# Patient Record
Sex: MALE | Race: WHITE | Employment: OTHER | URBAN - METROPOLITAN AREA
[De-identification: names, ages, dates, MRNs, and addresses within clinical notes are randomized per-mention and may not be internally consistent; named-entity substitution may affect disease eponyms.]

---

## 2020-05-11 ENCOUNTER — APPOINTMENT (OUTPATIENT)
Dept: CT IMAGING | Facility: CLINIC | Age: 65
DRG: 391 | End: 2020-05-11
Attending: EMERGENCY MEDICINE
Payer: OTHER GOVERNMENT

## 2020-05-11 ENCOUNTER — HOSPITAL ENCOUNTER (INPATIENT)
Facility: CLINIC | Age: 65
LOS: 7 days | Discharge: NURSING, REHAB OR LONG-TERM CARE | DRG: 391 | End: 2020-05-18
Attending: EMERGENCY MEDICINE | Admitting: HOSPITALIST
Payer: OTHER GOVERNMENT

## 2020-05-11 DIAGNOSIS — K57.32 DIVERTICULITIS OF COLON: ICD-10-CM

## 2020-05-11 DIAGNOSIS — R10.84 ABDOMINAL PAIN, GENERALIZED: Primary | ICD-10-CM

## 2020-05-11 DIAGNOSIS — E87.6 HYPOKALEMIA: ICD-10-CM

## 2020-05-11 DIAGNOSIS — E83.42 HYPOMAGNESEMIA: ICD-10-CM

## 2020-05-11 DIAGNOSIS — M62.81 GENERALIZED MUSCLE WEAKNESS: ICD-10-CM

## 2020-05-11 DIAGNOSIS — R19.7 DIARRHEA, UNSPECIFIED TYPE: ICD-10-CM

## 2020-05-11 DIAGNOSIS — K57.32 SIGMOID DIVERTICULITIS: ICD-10-CM

## 2020-05-11 DIAGNOSIS — F10.939 ALCOHOL WITHDRAWAL SYNDROME WITH COMPLICATION (H): ICD-10-CM

## 2020-05-11 DIAGNOSIS — G47.00 INSOMNIA, UNSPECIFIED TYPE: ICD-10-CM

## 2020-05-11 LAB
ALBUMIN SERPL-MCNC: 2.5 G/DL (ref 3.4–5)
ALBUMIN UR-MCNC: 20 MG/DL
ALP SERPL-CCNC: 167 U/L (ref 40–150)
ALT SERPL W P-5'-P-CCNC: 101 U/L (ref 0–70)
ANION GAP SERPL CALCULATED.3IONS-SCNC: 7 MMOL/L (ref 3–14)
APPEARANCE UR: CLEAR
AST SERPL W P-5'-P-CCNC: 156 U/L (ref 0–45)
BASOPHILS # BLD AUTO: 0.1 10E9/L (ref 0–0.2)
BASOPHILS NFR BLD AUTO: 1.4 %
BILIRUB SERPL-MCNC: 3.1 MG/DL (ref 0.2–1.3)
BILIRUB UR QL STRIP: ABNORMAL
BUN SERPL-MCNC: 22 MG/DL (ref 7–30)
CALCIUM SERPL-MCNC: 8.6 MG/DL (ref 8.5–10.1)
CHLORIDE SERPL-SCNC: 92 MMOL/L (ref 94–109)
CK SERPL-CCNC: 46 U/L (ref 30–300)
CO2 SERPL-SCNC: 32 MMOL/L (ref 20–32)
COLOR UR AUTO: ABNORMAL
CREAT SERPL-MCNC: 0.79 MG/DL (ref 0.66–1.25)
CRP SERPL-MCNC: 68.1 MG/L (ref 0–8)
DIFFERENTIAL METHOD BLD: ABNORMAL
EOSINOPHIL # BLD AUTO: 0.2 10E9/L (ref 0–0.7)
EOSINOPHIL NFR BLD AUTO: 2.4 %
ERYTHROCYTE [DISTWIDTH] IN BLOOD BY AUTOMATED COUNT: 14 % (ref 10–15)
ERYTHROCYTE [SEDIMENTATION RATE] IN BLOOD BY WESTERGREN METHOD: 48 MM/H (ref 0–20)
ETHANOL SERPL-MCNC: <0.01 G/DL
GFR SERPL CREATININE-BSD FRML MDRD: >90 ML/MIN/{1.73_M2}
GLUCOSE SERPL-MCNC: 116 MG/DL (ref 70–99)
GLUCOSE UR STRIP-MCNC: NEGATIVE MG/DL
HCT VFR BLD AUTO: 37.7 % (ref 40–53)
HGB BLD-MCNC: 12.7 G/DL (ref 13.3–17.7)
HGB UR QL STRIP: NEGATIVE
IMM GRANULOCYTES # BLD: 0.1 10E9/L (ref 0–0.4)
IMM GRANULOCYTES NFR BLD: 0.6 %
INR PPP: 0.95 (ref 0.86–1.14)
INTERPRETATION ECG - MUSE: NORMAL
KETONES UR STRIP-MCNC: ABNORMAL MG/DL
LEUKOCYTE ESTERASE UR QL STRIP: NEGATIVE
LIPASE SERPL-CCNC: 107 U/L (ref 73–393)
LYMPHOCYTES # BLD AUTO: 1.8 10E9/L (ref 0.8–5.3)
LYMPHOCYTES NFR BLD AUTO: 18.6 %
MAGNESIUM SERPL-MCNC: 1.3 MG/DL (ref 1.6–2.3)
MAGNESIUM SERPL-MCNC: 1.6 MG/DL (ref 1.6–2.3)
MCH RBC QN AUTO: 32.3 PG (ref 26.5–33)
MCHC RBC AUTO-ENTMCNC: 33.7 G/DL (ref 31.5–36.5)
MCV RBC AUTO: 96 FL (ref 78–100)
MONOCYTES # BLD AUTO: 0.6 10E9/L (ref 0–1.3)
MONOCYTES NFR BLD AUTO: 6.5 %
MUCOUS THREADS #/AREA URNS LPF: PRESENT /LPF
NEUTROPHILS # BLD AUTO: 6.7 10E9/L (ref 1.6–8.3)
NEUTROPHILS NFR BLD AUTO: 70.5 %
NITRATE UR QL: NEGATIVE
NRBC # BLD AUTO: 0 10*3/UL
NRBC BLD AUTO-RTO: 0 /100
PH UR STRIP: 6 PH (ref 5–7)
PLATELET # BLD AUTO: 154 10E9/L (ref 150–450)
POTASSIUM SERPL-SCNC: 3.1 MMOL/L (ref 3.4–5.3)
POTASSIUM SERPL-SCNC: 3.5 MMOL/L (ref 3.4–5.3)
PROCALCITONIN SERPL-MCNC: 0.84 NG/ML
PROT SERPL-MCNC: 7.2 G/DL (ref 6.8–8.8)
RBC # BLD AUTO: 3.93 10E12/L (ref 4.4–5.9)
RBC #/AREA URNS AUTO: <1 /HPF (ref 0–2)
SARS-COV-2 PCR COMMENT: NORMAL
SARS-COV-2 RNA SPEC QL NAA+PROBE: NEGATIVE
SARS-COV-2 RNA SPEC QL NAA+PROBE: NORMAL
SODIUM SERPL-SCNC: 131 MMOL/L (ref 133–144)
SOURCE: ABNORMAL
SP GR UR STRIP: 1.01 (ref 1–1.03)
SPECIMEN SOURCE: NORMAL
SPECIMEN SOURCE: NORMAL
SQUAMOUS #/AREA URNS AUTO: <1 /HPF (ref 0–1)
UROBILINOGEN UR STRIP-MCNC: 4 MG/DL (ref 0–2)
WBC # BLD AUTO: 9.6 10E9/L (ref 4–11)
WBC #/AREA URNS AUTO: 2 /HPF (ref 0–5)

## 2020-05-11 PROCEDURE — 25000125 ZZHC RX 250: Performed by: EMERGENCY MEDICINE

## 2020-05-11 PROCEDURE — 85610 PROTHROMBIN TIME: CPT | Performed by: EMERGENCY MEDICINE

## 2020-05-11 PROCEDURE — 99285 EMERGENCY DEPT VISIT HI MDM: CPT | Mod: 25

## 2020-05-11 PROCEDURE — 85025 COMPLETE CBC W/AUTO DIFF WBC: CPT | Performed by: EMERGENCY MEDICINE

## 2020-05-11 PROCEDURE — 96375 TX/PRO/DX INJ NEW DRUG ADDON: CPT

## 2020-05-11 PROCEDURE — 82550 ASSAY OF CK (CPK): CPT | Performed by: EMERGENCY MEDICINE

## 2020-05-11 PROCEDURE — 85652 RBC SED RATE AUTOMATED: CPT | Performed by: EMERGENCY MEDICINE

## 2020-05-11 PROCEDURE — 25000128 H RX IP 250 OP 636: Performed by: HOSPITALIST

## 2020-05-11 PROCEDURE — 25000128 H RX IP 250 OP 636: Performed by: EMERGENCY MEDICINE

## 2020-05-11 PROCEDURE — 96367 TX/PROPH/DG ADDL SEQ IV INF: CPT

## 2020-05-11 PROCEDURE — 84132 ASSAY OF SERUM POTASSIUM: CPT | Performed by: HOSPITALIST

## 2020-05-11 PROCEDURE — 25000132 ZZH RX MED GY IP 250 OP 250 PS 637: Performed by: EMERGENCY MEDICINE

## 2020-05-11 PROCEDURE — 83735 ASSAY OF MAGNESIUM: CPT | Performed by: EMERGENCY MEDICINE

## 2020-05-11 PROCEDURE — 80053 COMPREHEN METABOLIC PANEL: CPT | Performed by: EMERGENCY MEDICINE

## 2020-05-11 PROCEDURE — 25000132 ZZH RX MED GY IP 250 OP 250 PS 637: Performed by: PHYSICIAN ASSISTANT

## 2020-05-11 PROCEDURE — 70450 CT HEAD/BRAIN W/O DYE: CPT

## 2020-05-11 PROCEDURE — 96361 HYDRATE IV INFUSION ADD-ON: CPT

## 2020-05-11 PROCEDURE — 83690 ASSAY OF LIPASE: CPT | Performed by: EMERGENCY MEDICINE

## 2020-05-11 PROCEDURE — 84145 PROCALCITONIN (PCT): CPT | Performed by: HOSPITALIST

## 2020-05-11 PROCEDURE — 36415 COLL VENOUS BLD VENIPUNCTURE: CPT | Performed by: HOSPITALIST

## 2020-05-11 PROCEDURE — 12000000 ZZH R&B MED SURG/OB

## 2020-05-11 PROCEDURE — 81001 URINALYSIS AUTO W/SCOPE: CPT | Performed by: EMERGENCY MEDICINE

## 2020-05-11 PROCEDURE — 83735 ASSAY OF MAGNESIUM: CPT | Performed by: HOSPITALIST

## 2020-05-11 PROCEDURE — 99223 1ST HOSP IP/OBS HIGH 75: CPT | Mod: AI | Performed by: PHYSICIAN ASSISTANT

## 2020-05-11 PROCEDURE — 25800030 ZZH RX IP 258 OP 636: Performed by: PHYSICIAN ASSISTANT

## 2020-05-11 PROCEDURE — 71275 CT ANGIOGRAPHY CHEST: CPT

## 2020-05-11 PROCEDURE — 87635 SARS-COV-2 COVID-19 AMP PRB: CPT | Performed by: EMERGENCY MEDICINE

## 2020-05-11 PROCEDURE — 80320 DRUG SCREEN QUANTALCOHOLS: CPT | Performed by: EMERGENCY MEDICINE

## 2020-05-11 PROCEDURE — 93005 ELECTROCARDIOGRAM TRACING: CPT

## 2020-05-11 PROCEDURE — 85652 RBC SED RATE AUTOMATED: CPT | Performed by: HOSPITALIST

## 2020-05-11 PROCEDURE — 96365 THER/PROPH/DIAG IV INF INIT: CPT | Mod: 59

## 2020-05-11 PROCEDURE — 25800030 ZZH RX IP 258 OP 636: Performed by: EMERGENCY MEDICINE

## 2020-05-11 PROCEDURE — 86140 C-REACTIVE PROTEIN: CPT | Performed by: EMERGENCY MEDICINE

## 2020-05-11 RX ORDER — POTASSIUM CL/LIDO/0.9 % NACL 10MEQ/0.1L
10 INTRAVENOUS SOLUTION, PIGGYBACK (ML) INTRAVENOUS
Status: DISCONTINUED | OUTPATIENT
Start: 2020-05-11 | End: 2020-05-14

## 2020-05-11 RX ORDER — POTASSIUM CHLORIDE 29.8 MG/ML
20 INJECTION INTRAVENOUS
Status: DISCONTINUED | OUTPATIENT
Start: 2020-05-11 | End: 2020-05-14

## 2020-05-11 RX ORDER — HYDROMORPHONE HYDROCHLORIDE 1 MG/ML
0.2 INJECTION, SOLUTION INTRAMUSCULAR; INTRAVENOUS; SUBCUTANEOUS
Status: DISCONTINUED | OUTPATIENT
Start: 2020-05-11 | End: 2020-05-16

## 2020-05-11 RX ORDER — POTASSIUM CHLORIDE 1500 MG/1
40 TABLET, EXTENDED RELEASE ORAL ONCE
Status: COMPLETED | OUTPATIENT
Start: 2020-05-11 | End: 2020-05-11

## 2020-05-11 RX ORDER — LANOLIN ALCOHOL/MO/W.PET/CERES
100 CREAM (GRAM) TOPICAL ONCE
Status: COMPLETED | OUTPATIENT
Start: 2020-05-11 | End: 2020-05-11

## 2020-05-11 RX ORDER — MAGNESIUM SULFATE HEPTAHYDRATE 40 MG/ML
4 INJECTION, SOLUTION INTRAVENOUS EVERY 4 HOURS PRN
Status: DISCONTINUED | OUTPATIENT
Start: 2020-05-11 | End: 2020-05-14

## 2020-05-11 RX ORDER — PROCHLORPERAZINE MALEATE 5 MG
10 TABLET ORAL EVERY 6 HOURS PRN
Status: DISCONTINUED | OUTPATIENT
Start: 2020-05-11 | End: 2020-05-18 | Stop reason: HOSPADM

## 2020-05-11 RX ORDER — POTASSIUM CHLORIDE 1.5 G/1.58G
20-40 POWDER, FOR SOLUTION ORAL
Status: DISCONTINUED | OUTPATIENT
Start: 2020-05-11 | End: 2020-05-14

## 2020-05-11 RX ORDER — FOLIC ACID 1 MG/1
1 TABLET ORAL ONCE
Status: COMPLETED | OUTPATIENT
Start: 2020-05-11 | End: 2020-05-11

## 2020-05-11 RX ORDER — ONDANSETRON 2 MG/ML
4 INJECTION INTRAMUSCULAR; INTRAVENOUS EVERY 6 HOURS PRN
Status: DISCONTINUED | OUTPATIENT
Start: 2020-05-11 | End: 2020-05-18 | Stop reason: HOSPADM

## 2020-05-11 RX ORDER — PROCHLORPERAZINE 25 MG
25 SUPPOSITORY, RECTAL RECTAL EVERY 12 HOURS PRN
Status: DISCONTINUED | OUTPATIENT
Start: 2020-05-11 | End: 2020-05-18 | Stop reason: HOSPADM

## 2020-05-11 RX ORDER — ONDANSETRON 2 MG/ML
4 INJECTION INTRAMUSCULAR; INTRAVENOUS EVERY 30 MIN PRN
Status: DISCONTINUED | OUTPATIENT
Start: 2020-05-11 | End: 2020-05-11

## 2020-05-11 RX ORDER — SODIUM CHLORIDE 9 MG/ML
INJECTION, SOLUTION INTRAVENOUS CONTINUOUS
Status: DISCONTINUED | OUTPATIENT
Start: 2020-05-11 | End: 2020-05-15

## 2020-05-11 RX ORDER — IBUPROFEN 400 MG/1
400-800 TABLET, FILM COATED ORAL EVERY 6 HOURS PRN
Status: ON HOLD | COMMUNITY
End: 2020-05-18

## 2020-05-11 RX ORDER — LIDOCAINE 40 MG/G
CREAM TOPICAL
Status: DISCONTINUED | OUTPATIENT
Start: 2020-05-11 | End: 2020-05-18 | Stop reason: HOSPADM

## 2020-05-11 RX ORDER — OXYCODONE HYDROCHLORIDE 5 MG/1
5-10 TABLET ORAL
Status: DISCONTINUED | OUTPATIENT
Start: 2020-05-11 | End: 2020-05-18 | Stop reason: HOSPADM

## 2020-05-11 RX ORDER — ONDANSETRON 4 MG/1
4 TABLET, ORALLY DISINTEGRATING ORAL EVERY 6 HOURS PRN
Status: DISCONTINUED | OUTPATIENT
Start: 2020-05-11 | End: 2020-05-18 | Stop reason: HOSPADM

## 2020-05-11 RX ORDER — MORPHINE SULFATE 4 MG/ML
4 INJECTION, SOLUTION INTRAMUSCULAR; INTRAVENOUS ONCE
Status: COMPLETED | OUTPATIENT
Start: 2020-05-11 | End: 2020-05-11

## 2020-05-11 RX ORDER — ACETAMINOPHEN 325 MG/1
650 TABLET ORAL EVERY 4 HOURS PRN
Status: DISCONTINUED | OUTPATIENT
Start: 2020-05-11 | End: 2020-05-18 | Stop reason: HOSPADM

## 2020-05-11 RX ORDER — METHOCARBAMOL 500 MG/1
500 TABLET, FILM COATED ORAL 3 TIMES DAILY PRN
Status: ON HOLD | COMMUNITY
End: 2020-05-18

## 2020-05-11 RX ORDER — POTASSIUM CHLORIDE 1500 MG/1
20-40 TABLET, EXTENDED RELEASE ORAL
Status: DISCONTINUED | OUTPATIENT
Start: 2020-05-11 | End: 2020-05-14

## 2020-05-11 RX ORDER — MAGNESIUM SULFATE HEPTAHYDRATE 40 MG/ML
2 INJECTION, SOLUTION INTRAVENOUS ONCE
Status: COMPLETED | OUTPATIENT
Start: 2020-05-11 | End: 2020-05-11

## 2020-05-11 RX ORDER — POTASSIUM CHLORIDE 7.45 MG/ML
10 INJECTION INTRAVENOUS
Status: DISCONTINUED | OUTPATIENT
Start: 2020-05-11 | End: 2020-05-14

## 2020-05-11 RX ORDER — OMEPRAZOLE 20 MG/1
20 TABLET, DELAYED RELEASE ORAL DAILY PRN
COMMUNITY

## 2020-05-11 RX ORDER — MULTIVITAMIN,THERAPEUTIC
1 TABLET ORAL ONCE
Status: COMPLETED | OUTPATIENT
Start: 2020-05-11 | End: 2020-05-11

## 2020-05-11 RX ORDER — NALOXONE HYDROCHLORIDE 0.4 MG/ML
.1-.4 INJECTION, SOLUTION INTRAMUSCULAR; INTRAVENOUS; SUBCUTANEOUS
Status: DISCONTINUED | OUTPATIENT
Start: 2020-05-11 | End: 2020-05-18 | Stop reason: HOSPADM

## 2020-05-11 RX ORDER — IOPAMIDOL 755 MG/ML
500 INJECTION, SOLUTION INTRAVASCULAR ONCE
Status: COMPLETED | OUTPATIENT
Start: 2020-05-11 | End: 2020-05-11

## 2020-05-11 RX ADMIN — TAZOBACTAM SODIUM AND PIPERACILLIN SODIUM 3.38 G: 375; 3 INJECTION, SOLUTION INTRAVENOUS at 18:16

## 2020-05-11 RX ADMIN — MORPHINE SULFATE 4 MG: 4 INJECTION INTRAVENOUS at 11:27

## 2020-05-11 RX ADMIN — THIAMINE HCL TAB 100 MG 100 MG: 100 TAB at 13:43

## 2020-05-11 RX ADMIN — MAGNESIUM SULFATE HEPTAHYDRATE 2 G: 40 INJECTION, SOLUTION INTRAVENOUS at 08:57

## 2020-05-11 RX ADMIN — IOPAMIDOL 100 ML: 755 INJECTION, SOLUTION INTRAVENOUS at 09:43

## 2020-05-11 RX ADMIN — THERA TABS 1 TABLET: TAB at 13:43

## 2020-05-11 RX ADMIN — POTASSIUM CHLORIDE 40 MEQ: 1500 TABLET, EXTENDED RELEASE ORAL at 11:54

## 2020-05-11 RX ADMIN — ONDANSETRON 4 MG: 2 INJECTION INTRAMUSCULAR; INTRAVENOUS at 07:57

## 2020-05-11 RX ADMIN — SODIUM CHLORIDE 100 ML: 9 INJECTION, SOLUTION INTRAVENOUS at 09:43

## 2020-05-11 RX ADMIN — FOLIC ACID 1 MG: 1 TABLET ORAL at 13:43

## 2020-05-11 RX ADMIN — SODIUM CHLORIDE: 9 INJECTION, SOLUTION INTRAVENOUS at 13:30

## 2020-05-11 RX ADMIN — SODIUM CHLORIDE 1000 ML: 9 INJECTION, SOLUTION INTRAVENOUS at 07:57

## 2020-05-11 RX ADMIN — TAZOBACTAM SODIUM AND PIPERACILLIN SODIUM 4.5 G: 500; 4 INJECTION, SOLUTION INTRAVENOUS at 11:26

## 2020-05-11 ASSESSMENT — ENCOUNTER SYMPTOMS
WEAKNESS: 1
FEVER: 0
SHORTNESS OF BREATH: 0
COUGH: 0
DIARRHEA: 1
ACTIVITY CHANGE: 1
ABDOMINAL PAIN: 1

## 2020-05-11 ASSESSMENT — ACTIVITIES OF DAILY LIVING (ADL)
ADLS_ACUITY_SCORE: 13
ADLS_ACUITY_SCORE: 15

## 2020-05-11 ASSESSMENT — MIFFLIN-ST. JEOR: SCORE: 1767.41

## 2020-05-11 NOTE — ED NOTES
[facility]  ED Nurse Handoff Report    Dago Sheikh is a 64 year old male   ED Chief complaint: Diarrhea and Abdominal Pain  . ED Diagnosis:   Final diagnoses:   Diarrhea, unspecified type   Generalized muscle weakness   Hypokalemia   Hypomagnesemia   Diverticulitis of colon     Allergies: No Known Allergies    Code Status: Full Code - NOT ON FILE  Activity level - Baseline/Home:  Assist X 1. Activity Level - Current:   Assist X 1. Lift room needed: No. Bariatric: No   Needed: No   Isolation: Yes. Infection: C-diff pending (history of c-diff), Covid PUI (sent swab in ED)    Vital Signs:   Vitals:    05/11/20 1005 05/11/20 1010 05/11/20 1015 05/11/20 1030   BP:   (!) 141/75 133/85   Pulse:   95 94   Resp: 15 18 17 23   Temp:       TempSrc:       SpO2: 97% 95% 92% 90%   Weight:       Height:           Cardiac Rhythm:  ,      Pain level: 0-10 Pain Scale: 8  Patient confused: No. Patient Falls Risk: Yes.   Elimination Status: Has voided   Patient Report - Initial Complaint: Diarrhea, weakness. Focused Assessment: Presents to ED with intermittent diarrhea and LUQ abdominal pain for the past month. Pt does have prior history of c-diff. CT scan shows diverticulitis with abscess. Unable to collect stool sample in ED. Pt reports that he has been on his floor for two days. His son was able to bring him food and stuff to drink. Pt laid on left side, which is slightly edematous. Patient alert and oriented. Pt is alcoholic, denies hx of alcohol withdrawal seizures. Last drink two days ago. Pt placed on 2L O2 via NC, as patient was satting 88-89% on RA. ABCs intact with NC.    Tests Performed: Labs, urine, Covid swab, CT abdomen/chest/pelvis. Abnormal Results:   Labs Ordered and Resulted from Time of ED Arrival Up to the Time of Departure from the ED   CBC WITH PLATELETS DIFFERENTIAL - Abnormal; Notable for the following components:       Result Value    RBC Count 3.93 (*)     Hemoglobin 12.7 (*)      Hematocrit 37.7 (*)     All other components within normal limits   COMPREHENSIVE METABOLIC PANEL - Abnormal; Notable for the following components:    Sodium 131 (*)     Potassium 3.1 (*)     Chloride 92 (*)     Glucose 116 (*)     Bilirubin Total 3.1 (*)     Albumin 2.5 (*)     Alkaline Phosphatase 167 (*)      (*)      (*)     All other components within normal limits   ROUTINE UA WITH MICROSCOPIC - Abnormal; Notable for the following components:    Bilirubin Urine Small (*)     Ketones Urine Trace (*)     Protein Albumin Urine 20 (*)     Urobilinogen mg/dL 4.0 (*)     Mucous Urine Present (*)     All other components within normal limits   MAGNESIUM - Abnormal; Notable for the following components:    Magnesium 1.3 (*)     All other components within normal limits   ALCOHOL ETHYL   COVID-19 VIRUS (CORONAVIRUS) BY PCR   CK TOTAL   LIPASE   SARS-COV-2 (COVID-19) VIRUS RT-PCR   INR   PERIPHERAL IV CATHETER   CLOSTRIDIUM DIFFICILE TOXIN B   ENTERIC BACTERIA AND VIRUS PANEL BY LORETTA STOOL     CT Chest (PE) Abdomen Pelvis w Contrast   Preliminary Result   IMPRESSION:   1.  Findings are suspicious for acute sigmoid diverticulitis, with a   small adjacent diverticular abscess measuring 2.5 cm. This small   probable abscess is likely not amenable to CT-guided drainage due to   its small size.   2.  No evidence for pulmonary embolism or thoracic aortic dissection.   3.  Ectasia of the ascending thoracic aorta measures 4 cm in diameter.   4.  Marked diffuse fatty infiltration of the liver and probable   hepatomegaly.   5.  Cholelithiasis.       CT Head w/o Contrast   Preliminary Result   IMPRESSION:  Diffuse cerebral volume loss and cerebral white matter   changes consistent with chronic small vessel ischemic disease. No   evidence for acute intracranial pathology.          Radiation dose for this scan was reduced using automated exposure   control, adjustment of the mA and/or kV according to patient size, or    iterative reconstruction technique.        .   Treatments provided: 1L NS, 2g IV magnesium, IV morphine, IV Zosyn, IV Zofran  Family Comments: Will call and update sonTawanda ([phone](  OBS brochure/video discussed/provided to patient:  N/A  ED Medications:   Medications   ondansetron (ZOFRAN) injection 4 mg (4 mg Intravenous Given 5/11/20 0757)   piperacillin-tazobactam (ZOSYN) intermittent infusion 4.5 g (4.5 g Intravenous New Bag 5/11/20 1126)   0.9% sodium chloride BOLUS (0 mLs Intravenous Stopped 5/11/20 0857)   magnesium sulfate 2 g in water intermittent infusion (0 g Intravenous Stopped 5/11/20 0957)   CT Scan Flush (100 mLs Intravenous Given 5/11/20 0943)   iopamidol (ISOVUE-370) solution 500 mL (100 mLs Intravenous Given 5/11/20 0943)   morphine (PF) injection 4 mg (4 mg Intravenous Given 5/11/20 1127)     Drips infusing:  Yes - antibiotic  For the majority of the shift, the patient's behavior Green. Interventions performed were N/A.    Sepsis treatment initiated: No       ED Nurse Name/Phone Number: Nguyen Saini RN,   11:34 AM    RECEIVING UNIT ED HANDOFF REVIEW    Above ED Nurse Handoff Report was reviewed: Yes  Reviewed by: Karan Madrid RN on May 11, 2020 at 12:08 PM

## 2020-05-11 NOTE — ED PROVIDER NOTES
History   Chief Complaint:  Diarrhea and Abdominal Pain     HPI   Dago Sheikh is a 64 year old male with history of C. difficile and alcohol abuse who presents with diarrhea and abdominal pain. The patient reports that he has been having diarrhea for the last month. He has a history of C. Difficile which he last had a year ago in [place]. He also reports new lower abdominal pain. The patient states that he laid down on the ground to watch TV two days ago, and was unable to get up since then. He has been laying on his left side since that time. He denies fever, cough, shortness of breath, or chest pain. He does note some left arm weakness for the last month as well.  Uses alcohol. Denies withdrawal or seizures in past.    Allergies:  No Known Allergies    Medications:   Methocarbamol   Ibuprofen    Past Medical History:    C. Difficile  Alcohol abuse  Arthritis in knee  Chronic pain in back     Past Surgical History:    History reviewed. No pertinent surgical history.     Family History:    History reviewed. No pertinent family history.    Social History:  Smoking Status: Current Every Day Smoker  1 pack a day  Smokeless Tobacco: Never Used  Alcohol Use: Yes, several pints of vodka and several bottles of wine weekly  Drug Use: Never    Review of Systems   Constitutional: Positive for activity change. Negative for fever.   Respiratory: Negative for cough and shortness of breath.    Cardiovascular: Negative for chest pain.   Gastrointestinal: Positive for abdominal pain and diarrhea.   Neurological: Positive for weakness.   All other systems reviewed and are negative.    Physical Exam     Patient Vitals for the past 24 hrs:   BP Temp Temp src Pulse Heart Rate Resp SpO2 Height Weight   05/11/20 1030 133/85 -- -- 94 91 23 90 % -- --   05/11/20 1015 (!) 141/75 -- -- 95 96 17 92 % -- --   05/11/20 1010 -- -- -- -- 90 18 95 % -- --   05/11/20 1005 -- -- -- -- 90 15 97 % -- --   05/11/20 1000 (!) 142/102 -- -- 98 -- --  "-- -- --   05/11/20 0915 (!) 141/84 -- -- 90 98 14 98 % -- --   05/11/20 0900 (!) 139/92 -- -- 99 99 17 99 % -- --   05/11/20 0845 136/84 -- -- 97 96 17 99 % -- --   05/11/20 0830 (!) 150/98 -- -- 98 98 12 100 % -- --   05/11/20 0815 (!) 145/91 -- -- 102 105 15 98 % -- --   05/11/20 0800 (!) 159/95 -- -- 105 -- -- (!) 89 % -- --   05/11/20 0733 (!) 167/100 98.2  F (36.8  C) Oral 111 111 20 95 % 1.727 m (5' 8\") 99.8 kg (220 lb)       Physical Exam  General: Lying on back with mask on. Disheveled  Eyes:  The pupils are equal and round    Conjunctivae and sclerae are normal  ENT:    Moist mucous membranes  Neck:  Normal range of motion  CV:  Tachycardic rate and regular rhythm    Skin warm and well perfused   Resp:  Non labored breathing. No tachypnea.   GI:  Abdomen is soft, there is no rigidity    No distension    No rebound tenderness     Mild lower abdominal tenderness.  MS:  Normal muscular tone  Skin:  Mild edema on left arm    Erythema and dried stool on rectal area. No open areas  Neuro:   Awake, alert.      Speech is normal and fluent.    Face is symmetric.     Bilateral LE with no drift and equal strength bilaterally    Left arm seems mildly weak compared to right.    No tremors or tongue fasiculations   Psych:  Normal affect.  Appropriate interactions.    Emergency Department Course   ECG:  ECG taken at 0805, ECG read at 0811  Sinus tachycardia  Left anterior fascicular block  Nonspecific ST abnormality  Abnormal EKG  No old EKG  Rate 106 bpm. CA interval 148 ms. QRS duration 92 ms. QT/QTc 338/448 ms. P-R-T axes 73 -75 55.    Imaging:  Radiology findings were communicated with the patient who voiced understanding of the findings.    CT Chest (PE) Abdomen Pelvis w Contrast  IMPRESSION:  1.  Findings are suspicious for acute sigmoid diverticulitis, with a  small adjacent diverticular abscess measuring 2.5 cm. This small  probable abscess is likely not amenable to CT-guided drainage due to  its small size.  2.  " No evidence for pulmonary embolism or thoracic aortic dissection.  3.  Ectasia of the ascending thoracic aorta measures 4 cm in diameter.  4.  Marked diffuse fatty infiltration of the liver and probable  hepatomegaly.  5.  Cholelithiasis.   Reading per radiology    CT Head w/o Contrast  IMPRESSION:    Diffuse cerebral volume loss and cerebral white matter  changes consistent with chronic small vessel ischemic disease. No  evidence for acute intracranial pathology.     Radiation dose for this scan was reduced using automated exposure  control, adjustment of the mA and/or kV according to patient size, or  iterative reconstruction technique.  Reading per radiology     Laboratory:  Laboratory findings were communicated with the patient who voiced understanding of the findings.    CBC: WBC 9.6, HGB 12.7(L),   CMP: (L), potassium 3.1(L), chloride 92(L), Aipdjsb460(H), bilirubin 3/1(H), albumin 2.5(L), ALKPHOS 167(H), (H), (H) o/w WNL (Creatinine 0.79)  Alcohol level blood: <0.01  Magnesium: 1.3(L)  Lipase: 107  CK total: 46  INR: 0.95    COVID19 Virus PCR by nasopharyngeal swab pending     UA with microscopic: urinebilin small, urineketon trace, protein albumin 20(H), urobiligen 4.0(H), mucous present o/w WNL     Interventions:  0757 NS 1000 ml IV  0757 Zofran 4 mg IV  0857 Magnesium sulfate 2 g IV  1126 Zosyn 4.5 g IV  1127 Morphine 4 mg IV  1154 Potassium chloride 40 mEq Oral    Emergency Department Course:  Nursing notes and vitals reviewed.    0746 I performed an exam of the patient as documented above.     IV was inserted and blood was drawn for laboratory testing, results above.     COVID19 Virus PCR obtained, results above.       The patient provided a urine sample here in the emergency department. This was sent for laboratory testing, findings above.     The patient was sent for a CT Head and CT Chest Abdomen Pelvis while in the emergency department, results above.      1108 I rechecked  the patient. Explained findings to the Patient.    1117 I spoke with Sophia Car of the Hospitalist service from [facility] regarding patient's presentation, findings, and plan of care.     Findings and plan explained to the Patient who consents to admission. Discussed the patient with Dr. Arita, who will admit the patient to a adult med/surg bed for further monitoring, evaluation, and treatment.     Impression & Plan    Covid-19  Dago Sheikh was evaluated during a global COVID-19 pandemic, which necessitated consideration that the patient might be at risk for infection with the SARS-CoV-2 virus that causes COVID-19.   Applicable protocols for evaluation were followed during the patient's care.   COVID-19 was considered as part of the patient's evaluation. The plan for testing is:  a test was obtained during this visit.    Medical Decision Making:  Dago Sheikh is a 64 year old male who presents to the emergency department today for evaluation of diarrhea and abdominal pain. Patient has been laying on ground for last 2 days. Seems to have left arm weakness on exam that apparently has been there for last month. No new weakness over last few days. Has fallen and hit head over last month and given this, CT head obtained and no acute findings. His left arm does seem a little weaker than right arm which he says has been present for last month. He may have had a stroke at some point but certainly outside of window for intervention. There is also mild edema of that arm from laying on the left side so unclear how much of the weakness may be from the immobility for two days as well. Labs show electrolyte abnormalities, mildly abnormal LFTs likely from his alcohol use. Does not appear to be in alcohol withdrawal at this time. Was having abdominal pain and also mildly hypoxic so CT chest/abdomen/pelvis obtained. This shows diverticulitis with likely associated abscess that is too small for drainage. Given zosyn for  this. Will need admission to hospital given unable to ambulate safely at home and required assistance to get off from floor by EMS as well as the abscess/diverticulitis. Given era of COVID-19, diarrhea, and need for admission, COVID swab was sent but low suspicion for this at this time and found another reason for his diarrhea on CT abdomen. Discussed with hospitalist for admission.    Diagnosis:    ICD-10-CM    1. Diarrhea, unspecified type  R19.7 UA with Microscopic     COVID-19 Virus (Coronavirus) by PCR Nasopharyngeal     SARS-CoV-2 COVID-19 Virus (Coronavirus) RT-PCR     SARS-CoV-2 COVID-19 Virus (Coronavirus) RT-PCR   2. Generalized muscle weakness  M62.81    3. Hypokalemia  E87.6    4. Hypomagnesemia  E83.42    5. Diverticulitis of colon  K57.32        Disposition:   The patient is admitted into the care of Dr. Arita.    Scribe Disclosure:  I, Tammi Nicholas, am serving as a scribe at 7:33 AM on 5/11/2020 to document services personally performed by Anne Mccloud MD based on my observations and the provider's statements to me.   [facility] EMERGENCY DEPARTMENT       Anne Mccloud MD  05/11/20 1720

## 2020-05-11 NOTE — ED TRIAGE NOTES
Pt presents to ED with complaints of diarrhea and abdominal pain. Pt states he has a history of C. Diff and has been having diarrhea intermittently for one month. Pt had been on the floor for two days prior to calling EMS. Pt states LLQ abdominal pain has been ongoing for a month as well. Pt has hx of alcohol abuse, last drink 2 days ago. ABCs intact. Pt A&OX4.

## 2020-05-11 NOTE — H&P
[facility]    Hospitalist History and Physical    Name: Dago Sheikh    MRN: [id number]  YOB: 1955    Age: 64 year old  Date of Admission:  5/11/2020  Date of Service (when I saw the patient): 05/11/20    Assessment & Plan   Dago Sheikh is a 64 year old male with PMH significant for alcohol abuse, chronic knee and back pain, h/o c diff, diverticulosis, and tobacco abuse who presents with diarrhea and abdominal pain.     ED workup reveals: intermittently hypertensive up to 167/100, mildly hypoxic down to 89% on room air with improvement with 2 L of supplemental oxygen, initially tachycardic, sodium of 131, potassium of 3.1, chloride 92, magnesium of 1.3, albumin of 2.5, total bilirubin of 3.1, alkaline phosphatase of 167, ALT of 101, AST of 156, glucose of 116, hemoglobin of 12.7, UA shows small bilirubin, trace ketones, COVID-19 swab obtained and pending, EtOH level negative, normal lipase, normal total CK, EKG shows rate of 106 bpm in sinus tachycardia with left anterior fascicular block, nonspecific ST abnormality, CT of head negative for acute pathology, and CT of chest/abdomen/pelvis shows no evidence of PE, ectasia of the ascending thoracic aorta measures 4 cm in diameter, marked diffuse fatty infiltration of the liver, cholelithiasis, and findings suspicious for acute sigmoid diverticulitis with a small adjacent diverticular abscess measuring 2.5 cm (likely not amenable to CT-guided drainage due to its small size).     #Acute sigmoid diverticulitis: left and centralized lower abdominal pain for the past month with intermittent nonbloody diarrhea, nausea, and a few episodes of vomiting this past week with increased weakness, decreased appetite and poor PO intake. Per patient he has a prior h/o diverticulosis on colonoscopy performed about 5 years ago but no h/o diverticulitis. Afebrile, no leukocytosis. CT scan shows acute sigmoid diverticulitis with a small adjacent  diverticular abscess measuring 2.5 cm (likely not amendable to CT guided drainage due to its small size).   - NPO, able to receive medications and ice chips  - IVF hydration with NS at 100 ml/hour  - Supportive care with PRN antiemetics and pain control  - Continue IV Zosyn     #Hypoxia  #COVID19 PUI, low suspicion: mild hypoxia down to 88% on RA with improvement with 2 liters supplemental oxygen in ED. CT of chest negative for PE and otherwise clear. Patient did present with diarrhea but otherwise no reported or documented fevers and normal WBC. Due to concern for COVID19 the patient was tested by ED provider.   - PRN supplemental oxygen, wean as tolerated  - Await COVID19 results, able to be taken off isolation if negative     #Hyponatremia, hypokalemia, hypomagnesemia, and hypochloremia: likely 2/2 poor PO intake.   - IVF hydration with NS at 100 ml/hour  - Replace potassium and magnesium per protocol   - Recheck BMP in AM    #Daily alcohol abuse: reportedly drinks about 2 pints of vodka and 2-3 bottles of wine per week with his last drink the evening of 5/8. Reports no h/o withdrawal or seizures with stopping drinking. Etoh level negative. Does not appear to currently withdrawing.   - Monitor for signs of withdrawal, if concerns for withdrawal would start CIWA protocol    - Daily MVI, folic acid, and thiamine replacements     #Hepatitis: bilirubin of 3.1, alkaline phosphatase of 167, ALT of 101, and AST of 156, likely 2/2 daily alcohol use.  - Repeat LFTs in AM    #Anemia: Hgb of 12.7, continue to monitor      #Weakness: unable to get up off the floor at his home the last two days due to profound generalized weakness. No reported falls. Noted to have mild swelling of left UE per ED provider exam (likely dependent edema from laying on this side). CT of head obtained was negative for acute process.   - Consider PT consult pending mobility with nursing staff     DVT Prophylaxis: Pneumatic Compression Devices  Code  Status: Full Code, discussed with patient   Disposition: Expected stay >2 midnights, will admit to inpatient     Primary Care Physician   [facility]    Chief Complaint   Diarrhea and abdominal pain     History obtained from discussion with ED provider, Dr. Mccloud, chart review, and interview with patient via ipad in order to conserve PPE while awaiting results of COVID19.     History of Present Illness   Dago Sheikh is a 64 year old male who presents with diarrhea and lower abdominal pain.  The patient states that he has been having diarrhea intermittently over the last month.  He reports having up to 3-4 nonbloody stools per day with improvement in his output after taking over-the-counter Imodium.  With his diarrhea he also notes left and central lower abdominal pain that intermittently radiates into his upper abdomen with associated nausea and 2-3 episodes of nonbloody emesis this past week.  He reports taking an over-the-counter antacid at home with some relief in his upper abdominal discomfort.  He notes decreased appetite resulting in poor oral intake.  He states over the weekend he was able to tolerate drinking some water and 7-Up.  The patient states that on Friday he ended up laying down on the floor on his left side to watch TV since he does not own a couch and has had a difficult time getting off the floor since. Denies any fall prior to laying down.  The patient states that his son's girlfriend visited him on Saturday and was unable to help him up but was not concerned enough to call for help. Denies any recent fever, chills, cough, chest pain, shortness of breath, or urinary symptoms.  The patient states that he has not left his home recently and that one of his two sons have been bringing him food in order for him to avoid leaving his house.  Patient reports drinks approximately 2 pints of vodka and 2-3 bottles of wine per week.  His last drink was on Friday evening.  Denies feeling tremulous  or anxious.  Denies prior history of alcohol withdrawal or seizure when stopping drinking.  Denies prior history of asthma or COPD. He does not use supplemental oxygen at home. Denies any known COVID-19 exposure. He reports being hospitalized in [place] last year for a c diff infection. Denies any recent antibiotic use. He reports his last colonoscopy about 5 years ago showed diverticulosis. [facility]     Past Medical History    Alcohol abuse  Left knee pain  Back pain due to disc disease at L1 and L4     Past Surgical History   History reviewed. No pertinent surgical history.    Prior to Admission Medications   Prior to Admission Medications   Prescriptions Last Dose Informant Patient Reported? Taking?   ibuprofen (ADVIL/MOTRIN) 200 MG tablet 5/11/2020 at 0200  Yes Yes   Sig: Take 200 mg by mouth every 4 hours as needed for mild pain   methocarbamol (ROBAXIN) 500 MG tablet 5/11/2020 at 0200  Yes Yes   Sig: Take 500 mg by mouth 4 times daily as needed for muscle spasms      Facility-Administered Medications: None     Allergies   No Known Allergies    Social History   Social History     Tobacco Use     Smoking status: Current Every Day Smoker     Packs/day: 1.00     Types: Cigarettes     Smokeless tobacco: Never Used   Substance Use Topics     Alcohol use: Yes     Comment: couple pints of vodka and a couple bottles of wine weekly     Social History     Social History Narrative     Not on file     Family History   Family history reviewed with patient and is noncontributory.    Review of Systems   A Comprehensive greater than 10 system review of systems was carried out.  Pertinent positives and negatives are noted above.  Otherwise negative for contributory information.    Physical Exam   Temp: 98.2  F (36.8  C) Temp src: Oral BP: 133/85 Pulse: 94 Heart Rate: 91 Resp: 23 SpO2: 90 % O2 Device: Nasal cannula Oxygen Delivery: 2 LPM  Vital Signs with Ranges  Temp:  [98.2  F (36.8   C)] 98.2  F (36.8  C)  Pulse:  [] 94  Heart Rate:  [] 91  Resp:  [12-23] 23  BP: (133-167)/() 133/85  SpO2:  [89 %-100 %] 90 %  220 lbs 0 oz    Please refer to the physical exam performed by the ED provider today. Did not personally perform a physical exam since interview was conducted with an ipad in order to conserve PPE while awaiting COVID19 test results.     Data   Data reviewed today:  I personally reviewed the EKG tracing showing rate of 106 bpm in sinus tachycardia with left anterior fascicular block, nonspecific ST abnormality.    Results for orders placed or performed during the hospital encounter of 05/11/20   CT Head w/o Contrast     Status: None (Preliminary result)    Narrative    CT OF THE HEAD WITHOUT CONTRAST 5/11/2020 10:00 AM     COMPARISON: None    HISTORY: Fall, left arm weakness.    TECHNIQUE: 5 mm thick axial CT images of the head were acquired  without IV contrast material.    FINDINGS:  There is moderate diffuse cerebral volume loss. There are  subtle patchy areas of decreased density in the cerebral white matter  bilaterally that are consistent with sequela of chronic small vessel  ischemic disease.     The ventricles and basal cisterns are within normal limits in  configuration given the degree of cerebral volume loss.  There is no  midline shift. There are no extra-axial fluid collections.     No intracranial hemorrhage, mass or recent infarct.    The visualized paranasal sinuses are well-aerated. There is no  mastoiditis. There are no fractures of the visualized bones.       Impression    IMPRESSION:  Diffuse cerebral volume loss and cerebral white matter  changes consistent with chronic small vessel ischemic disease. No  evidence for acute intracranial pathology.       Radiation dose for this scan was reduced using automated exposure  control, adjustment of the mA and/or kV according to patient size, or  iterative reconstruction technique.   CT Chest (PE) Abdomen  Pelvis w Contrast     Status: None (Preliminary result)    Narrative    CT CHEST PULMONARY EMBOLUS ABDOMEN AND PELVIS WITH CONTRAST 5/11/2020  10:00 AM    CLINICAL HISTORY: Shortness of breath. Hypoxia. Abdominal pain.  Diarrhea.    TECHNIQUE: CT angiogram chest and routine CT abdomen pelvis with IV  contrast. Arterial phase through the chest and venous phase through  the abdomen and pelvis. 2D and 3D MIP reconstructions were preformed  by the CT technologist. Dose reduction techniques were used.     CONTRAST: 100mL Isovue-370    COMPARISON: None.    FINDINGS:  ANGIOGRAM CHEST: Pulmonary arteries are normal caliber and negative  for pulmonary emboli. Ectasia of the ascending thoracic aorta measures  4 cm in diameter. Atherosclerotic calcification of the thoracic aorta  and coronary arteries. Thoracic aorta is negative for dissection. No  convincing CT evidence of right heart strain.     LUNGS AND PLEURA: No pleural effusions. The lungs are clear.    MEDIASTINUM/AXILLAE: No enlarged lymph nodes are identified in the  chest. Scattered small mediastinal lymph nodes are visible, but do not  meet size criteria for pathologic enlargement. There is mild bilateral  gynecomastia. No pericardial effusion.    HEPATOBILIARY: There is marked diffuse fatty infiltration of the  liver. The liver also appears prominent in size. There is a large  gallstone noted within the gallbladder, measuring 2.6 cm.    PANCREAS: Normal.    SPLEEN: Normal.    ADRENAL GLANDS: Normal.    KIDNEYS/BLADDER: Unremarkable. No hydronephrosis.    BOWEL: Colonic diverticulosis. Hazy increased density and a small  amount of fluid adjacent to the sigmoid colon is suspicious for acute  diverticulitis. There is a small adjacent collection of gas and fluid  (series 20 image 43) measuring 2.5 cm, suspicious for a small  diverticular abscess. No bowel obstruction. Unremarkable appendix.  Small duodenal diverticulum.    LYMPH NODES: No enlarged lymph nodes are  identified in the abdomen or  pelvis.    PELVIC ORGANS: Normal.    OTHER: None.    MUSCULOSKELETAL: Degenerative changes are noted in the thoracolumbar  spine.      Impression    IMPRESSION:  1.  Findings are suspicious for acute sigmoid diverticulitis, with a  small adjacent diverticular abscess measuring 2.5 cm. This small  probable abscess is likely not amenable to CT-guided drainage due to  its small size.  2.  No evidence for pulmonary embolism or thoracic aortic dissection.  3.  Ectasia of the ascending thoracic aorta measures 4 cm in diameter.  4.  Marked diffuse fatty infiltration of the liver and probable  hepatomegaly.  5.  Cholelithiasis.    CBC with platelets differential     Status: Abnormal   Result Value Ref Range    WBC 9.6 4.0 - 11.0 10e9/L    RBC Count 3.93 (L) 4.4 - 5.9 10e12/L    Hemoglobin 12.7 (L) 13.3 - 17.7 g/dL    Hematocrit 37.7 (L) 40.0 - 53.0 %    MCV 96 78 - 100 fl    MCH 32.3 26.5 - 33.0 pg    MCHC 33.7 31.5 - 36.5 g/dL    RDW 14.0 10.0 - 15.0 %    Platelet Count 154 150 - 450 10e9/L    Diff Method Automated Method     % Neutrophils 70.5 %    % Lymphocytes 18.6 %    % Monocytes 6.5 %    % Eosinophils 2.4 %    % Basophils 1.4 %    % Immature Granulocytes 0.6 %    Nucleated RBCs 0 0 /100    Absolute Neutrophil 6.7 1.6 - 8.3 10e9/L    Absolute Lymphocytes 1.8 0.8 - 5.3 10e9/L    Absolute Monocytes 0.6 0.0 - 1.3 10e9/L    Absolute Eosinophils 0.2 0.0 - 0.7 10e9/L    Absolute Basophils 0.1 0.0 - 0.2 10e9/L    Abs Immature Granulocytes 0.1 0 - 0.4 10e9/L    Absolute Nucleated RBC 0.0    Comprehensive metabolic panel     Status: Abnormal   Result Value Ref Range    Sodium 131 (L) 133 - 144 mmol/L    Potassium 3.1 (L) 3.4 - 5.3 mmol/L    Chloride 92 (L) 94 - 109 mmol/L    Carbon Dioxide 32 20 - 32 mmol/L    Anion Gap 7 3 - 14 mmol/L    Glucose 116 (H) 70 - 99 mg/dL    Urea Nitrogen 22 7 - 30 mg/dL    Creatinine 0.79 0.66 - 1.25 mg/dL    GFR Estimate >90 >60 mL/min/[1.73_m2]    GFR Estimate If  Black >90 >60 mL/min/[1.73_m2]    Calcium 8.6 8.5 - 10.1 mg/dL    Bilirubin Total 3.1 (H) 0.2 - 1.3 mg/dL    Albumin 2.5 (L) 3.4 - 5.0 g/dL    Protein Total 7.2 6.8 - 8.8 g/dL    Alkaline Phosphatase 167 (H) 40 - 150 U/L     (H) 0 - 70 U/L     (H) 0 - 45 U/L   Alcohol level blood     Status: None   Result Value Ref Range    Ethanol g/dL <0.01 <0.01 g/dL   UA with Microscopic     Status: Abnormal   Result Value Ref Range    Color Urine Dark Yellow     Appearance Urine Clear     Glucose Urine Negative NEG^Negative mg/dL    Bilirubin Urine Small (A) NEG^Negative    Ketones Urine Trace (A) NEG^Negative mg/dL    Specific Gravity Urine 1.015 1.003 - 1.035    Blood Urine Negative NEG^Negative    pH Urine 6.0 5.0 - 7.0 pH    Protein Albumin Urine 20 (A) NEG^Negative mg/dL    Urobilinogen mg/dL 4.0 (H) 0.0 - 2.0 mg/dL    Nitrite Urine Negative NEG^Negative    Leukocyte Esterase Urine Negative NEG^Negative    Source Midstream Urine     WBC Urine 2 0 - 5 /HPF    RBC Urine <1 0 - 2 /HPF    Squamous Epithelial /HPF Urine <1 0 - 1 /HPF    Mucous Urine Present (A) NEG^Negative /LPF   Magnesium     Status: Abnormal   Result Value Ref Range    Magnesium 1.3 (L) 1.6 - 2.3 mg/dL   COVID-19 Virus (Coronavirus) by PCR Nasopharyngeal     Status: None    Specimen: Nasopharyngeal   Result Value Ref Range    COVID-19 Virus PCR to [facility] - Source Nasopharyngeal     COVID-19 Virus PCR to [facility] - Result       Test received-See reflex to IDDL test SARS CoV2 (COVID-19) Virus RT-PCR   CK total     Status: None   Result Value Ref Range    CK Total 46 30 - 300 U/L   Lipase     Status: None   Result Value Ref Range    Lipase 107 73 - 393 U/L   EKG 12-lead, tracing only     Status: None   Result Value Ref Range    Interpretation ECG Click View Image link to view waveform and result      Sophia ANDERSON I discussed the patient with Dr. Arita and he agrees with the above plan.

## 2020-05-11 NOTE — PLAN OF CARE
To Do:  End of Shift Summary  For vital signs and complete assessments, please see documentation flowsheets.     Pertinent assessments: BP 1483, currently on 2.5 lpm nc, ls w/ inspiratory wheezing, denied SOB, infrequent dry cough, skin intact, pain controled. Denied nausea  Major Shift Events Covid negative  Treatment Plan: IV zosyn

## 2020-05-11 NOTE — PHARMACY-ADMISSION MEDICATION HISTORY
Admission medication history interview status for this patient is complete. See [facility] admission navigator for allergy information, prior to admission medications and immunization status.     Medication history interview done via telephone during Covid-19 pandemic, indicate source(s): Patient  Medication history resources (including written lists, pill bottles, clinic record):[facility]  [facility]    Changes made to PTA medication list:  Added: Prilosec (patient is not positive on this - is a long acting otc product when I gave choices of names he thought it was this)  Deleted:   Changed: ibuprofen, methacarbamol    Actions taken by pharmacist (provider contacted, etc):sticky note for md     Additional medication history information:None    Medication reconciliation/reorder completed by provider prior to medication history?  N   (Y/N)     For patients on insulin therapy: NA  (Y/N)        Prior to Admission medications    Medication Sig Last Dose Taking? Auth Provider   ibuprofen (ADVIL/MOTRIN) 400 MG tablet Take 400-800 mg by mouth every 6 hours as needed for mild pain  5/10/2020 at 0200 Yes Reported, Patient   methocarbamol (ROBAXIN) 500 MG tablet Take 500 mg by mouth 3 times daily as needed for muscle spasms  5/10/2020 at 0200 Yes Reported, Patient   omeprazole (PRILOSEC OTC) 20 MG EC tablet Take 20 mg by mouth daily as needed Past Week at Unknown time Yes Unknown, Entered By History

## 2020-05-11 NOTE — PROVIDER NOTIFICATION
Dr. Arita paged: Pt. NEG. for COVID-19. Can we d/c special precautions and transfer out of negative air-flow room? Thanks!    Update: Patient care order received to discontinue special precautions.    Lupe Rizzo RN on 5/11/2020 at 3:33 PM

## 2020-05-12 ENCOUNTER — APPOINTMENT (OUTPATIENT)
Dept: PHYSICAL THERAPY | Facility: CLINIC | Age: 65
DRG: 391 | End: 2020-05-12
Attending: HOSPITALIST
Payer: OTHER GOVERNMENT

## 2020-05-12 ENCOUNTER — APPOINTMENT (OUTPATIENT)
Dept: CT IMAGING | Facility: CLINIC | Age: 65
DRG: 391 | End: 2020-05-12
Attending: HOSPITALIST
Payer: OTHER GOVERNMENT

## 2020-05-12 LAB
ALBUMIN SERPL-MCNC: 2.3 G/DL (ref 3.4–5)
ALP SERPL-CCNC: 135 U/L (ref 40–150)
ALT SERPL W P-5'-P-CCNC: 85 U/L (ref 0–70)
ANION GAP SERPL CALCULATED.3IONS-SCNC: 5 MMOL/L (ref 3–14)
AST SERPL W P-5'-P-CCNC: 137 U/L (ref 0–45)
BASOPHILS # BLD AUTO: 0.1 10E9/L (ref 0–0.2)
BASOPHILS NFR BLD AUTO: 1.4 %
BILIRUB SERPL-MCNC: 1.9 MG/DL (ref 0.2–1.3)
BUN SERPL-MCNC: 21 MG/DL (ref 7–30)
CALCIUM SERPL-MCNC: 8.6 MG/DL (ref 8.5–10.1)
CHLORIDE SERPL-SCNC: 99 MMOL/L (ref 94–109)
CO2 SERPL-SCNC: 32 MMOL/L (ref 20–32)
CREAT SERPL-MCNC: 0.72 MG/DL (ref 0.66–1.25)
DIFFERENTIAL METHOD BLD: ABNORMAL
EOSINOPHIL # BLD AUTO: 0.2 10E9/L (ref 0–0.7)
EOSINOPHIL NFR BLD AUTO: 2.6 %
ERYTHROCYTE [DISTWIDTH] IN BLOOD BY AUTOMATED COUNT: 15.1 % (ref 10–15)
ERYTHROCYTE [SEDIMENTATION RATE] IN BLOOD BY WESTERGREN METHOD: 78 MM/H (ref 0–20)
GFR SERPL CREATININE-BSD FRML MDRD: >90 ML/MIN/{1.73_M2}
GLUCOSE SERPL-MCNC: 84 MG/DL (ref 70–99)
GRAM STN SPEC: NORMAL
GRAM STN SPEC: NORMAL
HCT VFR BLD AUTO: 36.7 % (ref 40–53)
HGB BLD-MCNC: 11.5 G/DL (ref 13.3–17.7)
IMM GRANULOCYTES # BLD: 0.1 10E9/L (ref 0–0.4)
IMM GRANULOCYTES NFR BLD: 1.2 %
LYMPHOCYTES # BLD AUTO: 1.2 10E9/L (ref 0.8–5.3)
LYMPHOCYTES NFR BLD AUTO: 14.5 %
MCH RBC QN AUTO: 32.7 PG (ref 26.5–33)
MCHC RBC AUTO-ENTMCNC: 31.3 G/DL (ref 31.5–36.5)
MCV RBC AUTO: 104 FL (ref 78–100)
MONOCYTES # BLD AUTO: 0.7 10E9/L (ref 0–1.3)
MONOCYTES NFR BLD AUTO: 8.6 %
NEUTROPHILS # BLD AUTO: 6 10E9/L (ref 1.6–8.3)
NEUTROPHILS NFR BLD AUTO: 71.7 %
NRBC # BLD AUTO: 0 10*3/UL
NRBC BLD AUTO-RTO: 0 /100
PLATELET # BLD AUTO: 124 10E9/L (ref 150–450)
POTASSIUM SERPL-SCNC: 3.4 MMOL/L (ref 3.4–5.3)
PROT SERPL-MCNC: 6.6 G/DL (ref 6.8–8.8)
RBC # BLD AUTO: 3.52 10E12/L (ref 4.4–5.9)
SODIUM SERPL-SCNC: 136 MMOL/L (ref 133–144)
SPECIMEN SOURCE: NORMAL
WBC # BLD AUTO: 8.4 10E9/L (ref 4–11)

## 2020-05-12 PROCEDURE — 85025 COMPLETE CBC W/AUTO DIFF WBC: CPT | Performed by: PHYSICIAN ASSISTANT

## 2020-05-12 PROCEDURE — 97530 THERAPEUTIC ACTIVITIES: CPT | Mod: GP

## 2020-05-12 PROCEDURE — 87070 CULTURE OTHR SPECIMN AEROBIC: CPT | Performed by: RADIOLOGY

## 2020-05-12 PROCEDURE — 40000275 ZZH STATISTIC RCP TIME EA 10 MIN

## 2020-05-12 PROCEDURE — 25000125 ZZHC RX 250

## 2020-05-12 PROCEDURE — 97161 PT EVAL LOW COMPLEX 20 MIN: CPT | Mod: GP

## 2020-05-12 PROCEDURE — 87186 SC STD MICRODIL/AGAR DIL: CPT | Performed by: RADIOLOGY

## 2020-05-12 PROCEDURE — 36415 COLL VENOUS BLD VENIPUNCTURE: CPT | Performed by: HOSPITALIST

## 2020-05-12 PROCEDURE — 87205 SMEAR GRAM STAIN: CPT | Performed by: RADIOLOGY

## 2020-05-12 PROCEDURE — 99233 SBSQ HOSP IP/OBS HIGH 50: CPT | Performed by: HOSPITALIST

## 2020-05-12 PROCEDURE — 97116 GAIT TRAINING THERAPY: CPT | Mod: GP

## 2020-05-12 PROCEDURE — 87075 CULTR BACTERIA EXCEPT BLOOD: CPT | Performed by: RADIOLOGY

## 2020-05-12 PROCEDURE — 87077 CULTURE AEROBIC IDENTIFY: CPT | Performed by: RADIOLOGY

## 2020-05-12 PROCEDURE — 0W9G3ZX DRAINAGE OF PERITONEAL CAVITY, PERCUTANEOUS APPROACH, DIAGNOSTIC: ICD-10-PCS | Performed by: RADIOLOGY

## 2020-05-12 PROCEDURE — 80053 COMPREHEN METABOLIC PANEL: CPT | Performed by: PHYSICIAN ASSISTANT

## 2020-05-12 PROCEDURE — 25000132 ZZH RX MED GY IP 250 OP 250 PS 637: Performed by: HOSPITALIST

## 2020-05-12 PROCEDURE — 25000132 ZZH RX MED GY IP 250 OP 250 PS 637: Performed by: PHYSICIAN ASSISTANT

## 2020-05-12 PROCEDURE — 12000000 ZZH R&B MED SURG/OB

## 2020-05-12 PROCEDURE — 85652 RBC SED RATE AUTOMATED: CPT | Performed by: HOSPITALIST

## 2020-05-12 PROCEDURE — 25800030 ZZH RX IP 258 OP 636: Performed by: PHYSICIAN ASSISTANT

## 2020-05-12 PROCEDURE — 85025 COMPLETE CBC W/AUTO DIFF WBC: CPT | Performed by: HOSPITALIST

## 2020-05-12 PROCEDURE — 36415 COLL VENOUS BLD VENIPUNCTURE: CPT | Performed by: PHYSICIAN ASSISTANT

## 2020-05-12 PROCEDURE — 25000128 H RX IP 250 OP 636: Performed by: HOSPITALIST

## 2020-05-12 PROCEDURE — 27211108 CT ABDOMEN PERITONEUM ABSCESS ASPIRATION

## 2020-05-12 RX ORDER — LIDOCAINE HYDROCHLORIDE 10 MG/ML
INJECTION, SOLUTION INFILTRATION; PERINEURAL
Status: COMPLETED
Start: 2020-05-12 | End: 2020-05-12

## 2020-05-12 RX ORDER — FENTANYL CITRATE 50 UG/ML
INJECTION, SOLUTION INTRAMUSCULAR; INTRAVENOUS
Status: DISCONTINUED
Start: 2020-05-12 | End: 2020-05-12 | Stop reason: WASHOUT

## 2020-05-12 RX ADMIN — MICONAZOLE NITRATE: 20 POWDER TOPICAL at 11:00

## 2020-05-12 RX ADMIN — TAZOBACTAM SODIUM AND PIPERACILLIN SODIUM 3.38 G: 375; 3 INJECTION, SOLUTION INTRAVENOUS at 14:57

## 2020-05-12 RX ADMIN — SODIUM CHLORIDE: 9 INJECTION, SOLUTION INTRAVENOUS at 00:10

## 2020-05-12 RX ADMIN — MICONAZOLE NITRATE: 20 POWDER TOPICAL at 05:43

## 2020-05-12 RX ADMIN — OXYCODONE HYDROCHLORIDE 5 MG: 5 TABLET ORAL at 19:51

## 2020-05-12 RX ADMIN — ACETAMINOPHEN 650 MG: 325 TABLET, FILM COATED ORAL at 19:51

## 2020-05-12 RX ADMIN — TAZOBACTAM SODIUM AND PIPERACILLIN SODIUM 3.38 G: 375; 3 INJECTION, SOLUTION INTRAVENOUS at 21:05

## 2020-05-12 RX ADMIN — LIDOCAINE HYDROCHLORIDE 10 ML: 10 INJECTION, SOLUTION INFILTRATION; PERINEURAL at 13:21

## 2020-05-12 RX ADMIN — SODIUM CHLORIDE: 9 INJECTION, SOLUTION INTRAVENOUS at 21:09

## 2020-05-12 RX ADMIN — ACETAMINOPHEN 650 MG: 325 TABLET, FILM COATED ORAL at 00:20

## 2020-05-12 RX ADMIN — TAZOBACTAM SODIUM AND PIPERACILLIN SODIUM 3.38 G: 375; 3 INJECTION, SOLUTION INTRAVENOUS at 05:43

## 2020-05-12 RX ADMIN — TAZOBACTAM SODIUM AND PIPERACILLIN SODIUM 3.38 G: 375; 3 INJECTION, SOLUTION INTRAVENOUS at 00:07

## 2020-05-12 ASSESSMENT — ACTIVITIES OF DAILY LIVING (ADL)
ADLS_ACUITY_SCORE: 15

## 2020-05-12 NOTE — PROVIDER NOTIFICATION
MD paged -   Pt oxygen stats dropping to 60% on 5L oxygen when asleep. States he has sleep apnea but has not worn CPAP in years. Any recommendations?    MD came to see pt. Stats came back up to 94% on 8L oxymask. Address in AM

## 2020-05-12 NOTE — PROGRESS NOTES
"CLINICAL NUTRITION SERVICES  -  ASSESSMENT NOTE    Recommendations Ordered by Registered Dietitian (RD):   Diet advancement per MD + oral nutritional supplements when appropriate   Thiamine, MVI, folic acid per MD given hx of EtOH abuse    Malnutrition:   % Weight Loss: unable to determine, limited weight history   % Intake: unable to determine, see above   Subcutaneous Fat Loss: unable to determine, deferred  Muscle Loss: unable to determine, deferred  Fluid Retention:  None noted    Malnutrition Diagnosis: Unable to determine due to lack of weight history, nutrition hx and nutrition focused physical exam per direction of new department policy in the setting of COVID19     REASON FOR ASSESSMENT  Dago Sheikh is a 64 year old male seen by Registered Dietitian for Admission Nutrition Risk Screen for reduced oral intake over the last month    NUTRITION HISTORY  - Information obtained from chart, no answer x 2 via phone.   - Pt admitted for acute sigmoid diverticulitis, hypoxia, hyponatremia, hypokalemia, hypomagnesia and hypochloremia. Pt symptoms include diarrhea and abdominal pain   - History of alcohol abuse, chronic knee and back pain, h/o c diff, diverticulosis, and tobacco abuse  - Unable to obtain nutrition history from patient, see above   - Per the H&P: \"left and centralized lower abdominal pain for the past month with intermittent nonbloody diarrhea, nausea, and a few episodes of vomiting this past week with increased weakness, decreased appetite and poor PO intake\"   - Food allergies: NKFA    CURRENT NUTRITION ORDERS  Diet Order:     NPO     Current Intake/Tolerance:  NPO     NUTRITION FOCUSED PHYSICAL ASSESSMENT FOR DIAGNOSING MALNUTRITION)  No:  per direction of new department policy in the setting of COVID19            Observed:    Deferred d/t above    Obtained from Chart/Interdisciplinary Team:  - Last BM not recorded in the I/O   - Pt requiring oxymask (3-8L)     ANTHROPOMETRICS  Height: 5' " "8\"  Weight: 99.8 kg ( 220 lbs 0 oz )   Body mass index is 33.45 kg/m .  Weight Status:  Obesity Grade I BMI 30-34.9  Weight History: No weight history to comment on.  Wt Readings from Last 10 Encounters:   05/11/20 99.8 kg (220 lb)     LABS  Labs reviewed      Electrolytes  Potassium (mmol/L)   Date Value   05/12/2020 3.4   05/11/2020 3.5   05/11/2020 3.1 (L)    Blood Glucose  Glucose (mg/dL)   Date Value   05/12/2020 84   05/11/2020 116 (H)    Inflammatory Markers  CRP Inflammation (mg/L)   Date Value   05/11/2020 68.1 (H)     WBC (10e9/L)   Date Value   05/11/2020 9.6     Albumin (g/dL)   Date Value   05/12/2020 2.3 (L)   05/11/2020 2.5 (L)      Magnesium (mg/dL)   Date Value   05/11/2020 1.6   05/11/2020 1.3 (L)     Sodium (mmol/L)   Date Value   05/12/2020 136   05/11/2020 131 (L)    Renal  Urea Nitrogen (mg/dL)   Date Value   05/12/2020 21   05/11/2020 22     Creatinine (mg/dL)   Date Value   05/12/2020 0.72   05/11/2020 0.79     Additional  Ketones Urine (mg/dL)   Date Value   05/11/2020 Trace (A)        MEDICATIONS  Medications reviewed      piperacillin-tazobactam  3.375 g Intravenous Q6H     sodium chloride (PF)  3 mL Intracatheter Q8H        sodium chloride 100 mL/hr at 05/12/20 0010      acetaminophen, HYDROmorphone, lidocaine 4%, lidocaine (buffered or not buffered), magnesium sulfate, melatonin, miconazole, naloxone, ondansetron **OR** ondansetron, oxyCODONE, potassium chloride, potassium chloride with lidocaine, potassium chloride, potassium chloride, potassium chloride, prochlorperazine **OR** prochlorperazine **OR** prochlorperazine, sodium chloride (PF)     ASSESSED NUTRITION NEEDS PER APPROVED PRACTICE GUIDELINES:    Dosing Weight 99.8 kg   Estimated Energy Needs: 6268-3218 kcals (20-25 Kcal/Kg)  Justification: maintenance and obese  Estimated Protein Needs: 100-120 grams protein (1-1.2 g pro/Kg)  Justification: maintenance  Estimated Fluid Needs: per MD     MALNUTRITION:  % Weight Loss: unable to " determine, limited weight history   % Intake: unable to determine, see above   Subcutaneous Fat Loss: unable to determine, deferred  Muscle Loss: unable to determine, deferred  Fluid Retention:  None noted    Malnutrition Diagnosis: Unable to determine due to lack of weight history, nutrition hx and nutrition focused physical exam per direction of new department policy in the setting of COVID19    NUTRITION DIAGNOSIS:  Inadequate oral intake related to nausea/vomiting, decreased appetite and diarrhea 2/2 acute sigmoid diverticulitis as evidenced by suspected poor PO intake for the past ~month.     NUTRITION INTERVENTIONS  Recommendations / Nutrition Prescription  Diet advancement per MD + oral nutritional supplements when appropriate   Thiamine, MVI, folic acid per MD given hx of EtOH abuse     Implementation  Nutrition education: deferred, no answer via phone   Collaboration and Referral of Nutrition care: discussed pt during interdisciplinary rounds this morning     Nutrition Goals  Diet to advance >/=fulls in the next 48-72 hours    MONITORING AND EVALUATION:  Progress towards goals will be monitored and evaluated per protocol and Practice Guidelines    Yue Pate RD, LD  Clinical Dietitian

## 2020-05-12 NOTE — PROCEDURES
[facility]    Procedure: Abscess aspiration.     Date/Time: 5/12/2020 1:27 PM  Performed by: Kalina Rivero DO  Authorized by: Kalina Rivero DO     UNIVERSAL PROTOCOL   Site Marked: Yes  Prior Images Obtained and Reviewed:  Yes  Required items: Required blood products, implants, devices and special equipment available    Patient identity confirmed:  Verbally with patient, arm band, provided demographic data and hospital-assigned identification number  Patient was reevaluated immediately before administering moderate or deep sedation or anesthesia  Confirmation Checklist:  Patient's identity using two indicators, relevant allergies, procedure was appropriate and matched the consent or emergent situation and correct equipment/implants were available  Time out: Immediately prior to the procedure a time out was called    Universal Protocol: the Joint Commission Universal Protocol was followed    Preparation: Patient was prepped and draped in usual sterile fashion           ANESTHESIA    Anesthesia: Local infiltration  Local Anesthetic:  Lidocaine 1% without epinephrine      SEDATION    Patient Sedated: No    See dictated procedure note for full details.  Findings: CT guided abdominal abscess aspiration.     Specimens: none and fluid and/or tissue for gram stain and culture    Complications: None    Condition: Stable    Plan: Gram stain and culture.     The abscess was too small for a drain placement. 8 mL purulent fluid was aspirated and sent to the lab.     PROCEDURE   Patient Tolerance:  Patient tolerated the procedure well with no immediate complications    Length of time physician/provider present for 1:1 monitoring during sedation: 0

## 2020-05-12 NOTE — CONSULTS
[facility]  Colon and Rectal Surgery Consult Note  Name: Dago Sheikh    MRN: [id number]  YOB: 1955    Age: 64 year old  Date of admission: 5/11/2020  Primary care provider: [facility] [place]     Requesting Physician:  Dr Arita  Reason for consult:  Presumed diverticulitis w/microperforation/abscess             History of Present Illness:   Dago Sheikh is a 64 year old male with PMHx alcohol abuse and smoker, seen at the request of Dr Arita, who presents with diarrhea and lower abdominal pain.  The patient states that he has been having diarrhea intermittently over the last month.  He reports having up to 3-4 nonbloody stools per day with improvement in his output after taking over-the-counter Imodium. He last took imodium on Friday. He also had associated LLQ pain, nausea, decreased appetite, weight gain, and a few episodes of nonbloody emesis.     Work-up in the hospital significant for intermittent hypertension, mild hypoxia with improvement with 2 L of supplemental oxygen, tachycardia in the low 100s. Labs significant for Hgb=11.5, albumin of 2.3, elevated liver enxymes. WBC WNL. CT abdomen with findings suspicious for acute sigmoid diverticulitis with a small adjacent diverticular abscess measuring 2.5 cm. IR aspirated approximately 8ml fluid today.     On evaluation this afternoon, patient is in NAD. Denies much abdominal pain and just passed a very normal bowel movement per patient. Denies any blood per rectum or weight loss. Usually has a bowel movement daily or every other day. Patient reports he drinks approximately 2 pints of vodka and 2-3 bottles of wine per week. Smokes 1/2 pack to 1 pack per day. He has never had diverticulitis episode like this in the past but does have history of C diff infection. Denies family history of colon or rectal cancers.     Colonoscopy History:  5 years ago per patient. No polyps removed. Diverticulosis noted.     Past abdominal  "surgery: None            Past Medical History:   History reviewed. No pertinent past medical history.          Past Surgical History:   History reviewed. No pertinent surgical history.            Social History:     Social History     Tobacco Use     Smoking status: Current Every Day Smoker     Packs/day: 1.00     Types: Cigarettes     Smokeless tobacco: Never Used   Substance Use Topics     Alcohol use: Yes     Comment: couple pints of vodka and a couple bottles of wine weekly             Family History:   History reviewed. No pertinent family history.          Allergies:   No Known Allergies          Medications:       piperacillin-tazobactam  3.375 g Intravenous Q6H     sodium chloride (PF)  3 mL Intracatheter Q8H             Review of Systems:   A comprehensive greater than 10 system review of systems was carried out.  Pertinent positives and negatives are noted above.  Otherwise negative for contributory info.            Physical Exam:     Blood pressure 126/69, pulse 103, temperature 98.1  F (36.7  C), temperature source Oral, resp. rate 20, height 1.727 m (5' 8\"), weight 99.8 kg (220 lb), SpO2 92 %.    Intake/Output Summary (Last 24 hours) at 5/12/2020 1658  Last data filed at 5/12/2020 1203  Gross per 24 hour   Intake 1486 ml   Output 325 ml   Net 1161 ml     Exam:  General - Awake alert and oriented, appears stated age. Obese. Slightly jaundiced. Up in chair.   Pulm - Non-labored breathing with normal respiratory effort  CVS - reg rate and rhythm, no peripheral edema  Abd - Round. Soft, minimally tender.  No guarding, rigidity or peritoneal signs.   Neuro - CN II-XII grossly intact  Musculoskeletal - extremities with no clubbing, cyanosis or edema; able to ambulate  Psych - responsive, alert, cooperative; oriented x3; appropriate mood and affect  External/skin - inspection reveals no rashes, lesions or ulcers, normal coloring             Data Reviewed:     Recent Results (from the past 24 hour(s))   CT " Abdomen Peritoneum Abscess Aspiration    Narrative    PROCEDURE: CT guided abscess aspiration.     DATE: 5/12/2020    MEDICATIONS: 1% lidocaine. Throughout the procedure, the patient was  monitored by a radiology nurse for cardiac rhythm and oxygen  saturation which remained stable.    CONTRAST: None.    COMPLICATIONS: None    HISTORY: Diverticulitis with small abscess.    PROCEDURE AND FINDINGS: Following a discussion of the risks, benefits,  indications and alternatives to treatment, appropriate informed  consent was obtained.  The patient was brought to the CT department  and placed supine on the table. The right abdomen was prepped and  draped in a sterile fashion.  A time out was performed per universal  protocol policy to ensure correct patient, site and procedure to be  performed. Radiation dose for this scan was reduced using automated  exposure control, adjustment of the mA and/or kV according to patient  size, or iterative reconstruction technique.     5 mm unenhanced images were obtained through the region of the  abdomen, images showed a small abscess. A suitable route for  percutaneous drainage was then chosen. The overlying skin was prepped  and draped in a sterile manner. 1% lidocaine was used for local  anesthesia. Under CT guidance, 5 Telugu Yueh needle was advanced to  the abscess. The catheter was advanced off of the needle. 8 mL of  purulent fluid was aspirated and sent to lab for culture. The catheter  was removed and a sterile dressing was applied.     Throughout the procedure, the patient was monitored by a radiology  nurse for cardiac rhythm and oxygen saturation which remained stable.  The patient tolerated the procedure well and left interventional  radiology in stable condition.      Impression    IMPRESSION: CT-guided abdominal abscess aspiration.    KAMRYN TUTTLE DO       Recent Labs   Lab 05/12/20  0818 05/11/20  0800   WBC 8.4 9.6   HGB 11.5* 12.7*   HCT 36.7* 37.7*   * 96    * 154     Recent Labs   Lab 05/12/20  0710 05/11/20  1748 05/11/20  0800     --  131*   POTASSIUM 3.4 3.5 3.1*   CHLORIDE 99  --  92*   CO2 32  --  32   ANIONGAP 5  --  7   GLC 84  --  116*   BUN 21  --  22   CR 0.72  --  0.79   GFRESTIMATED >90  --  >90   GFRESTBLACK >90  --  >90   SY 8.6  --  8.6     No results for input(s): LACT in the last 168 hours.  Recent Labs   Lab 05/11/20  0811   COLOR Dark Yellow   APPEARANCE Clear   URINEGLC Negative   URINEBILI Small*   URINEKETONE Trace*   SG 1.015   UBLD Negative   URINEPH 6.0   PROTEIN 20*   NITRITE Negative   LEUKEST Negative   RBCU <1   WBCU 2         Assessment and Plan:   Dago Sheikh is a 64 year old male who presented with abdominal pain and diarrhea for the past month. CT abdomen with evidence of acute sigmoid diverticulitis and small pericolonic abscess. Now s/p IR aspiration of fluid. Currently, afebrile WBC WNL. Passed normal bowel movement today. Minimal abdominal pain and exam benign. No plans for urgent surgery at this time. Recommend IVF, IV antibiotics, and bowel rest. Colonoscopy in 6 weeks after acute episode resolves.      Plan:  1. Admit to hospitalist  2. Surgery: No indication for urgent surgery at this time. Did discuss possibility of more urgent surgery if fails to improve with conservative measures.   3. Diet: NPO, ice chips and sips of clears OK  4. IV Fluids: continue  5. Antibiotics:  Continue IV zosyn, follow-up abscess cultures   6. Medications:  No change from CRS  7. I&O s:  strict I&O s   8. Labs:   - Reviewed: Yes  - Ordered: None   9. Imaging:   - Dr. Pederson and myself have personally viewed: CT abd/pelvis  - Ordered:  None  10. Activity:  OOB, ambulate as able  11. DVT prophylaxis: SCD s  12. This plan has been discussed with Dr. Pederson    Patient specific identified risk factors considered as part of today s evaluation include: smoker, BMI>30, alcohol abuse     Additional history obtained from ED notes, hospitalist  notes.  Time spent on consultation: 25 minutes, greater than 50% spent on counseling and/or coordination of care.          Tammi Elliott PA-C  [facility]  [phone]

## 2020-05-12 NOTE — PROGRESS NOTES
05/12/20 1400   Quick Adds   Type of Visit Initial PT Evaluation   Living Environment   Lives With alone   Living Arrangements apartment   Living Environment Comment Patient reports he lives alone in a ground floor apartment with no stairs to enter. Reports there is a flight iof stairs to the laundry but family has been helping. Pt typically is as homebody. Family delivers meals. Pt reports he does not drive because of his cataracts. He manages his own medications.    Self-Care   Usual Activity Tolerance fair   Current Activity Tolerance poor   Equipment Currently Used at Home cane, straight   Functional Level Prior   Ambulation 1-->assistive equipment  (intermittent cane use )   Transferring 0-->independent   Fall history within last six months yes   Number of times patient has fallen within last six months 2   General Information   Onset of Illness/Injury or Date of Surgery - Date 05/11/20   Referring Physician Juan J HERNANDEZ   Patient/Family Goals Statement Return home; to get stronger   Pertinent History of Current Problem (include personal factors and/or comorbidities that impact the POC) 64 year old male admitted with acute sigmoid diverticulitis. PMH significant for ETOH abuse.    Precautions/Limitations fall precautions   General Observations [facility]   Cognitive Status Examination   Orientation orientation to person, place and time   Level of Consciousness alert   Personal Safety and Judgment intact   Pain Assessment   Patient Currently in Pain No   Posture    Posture Forward head position;Kyphosis  (Worsens in standing position)   Range of Motion (ROM)   ROM Comment UE/LE AROM WFLs; no deficits noted with function   Strength   Strength Comments Decreased generally; needs assist with mobility as noted below.    Bed Mobility   Bed Mobility Comments Not assessed.    Transfer Skills   Transfer Comments Sit>stand with Shazia   Gait   Gait Comments Ambulates with FWW and CGA/Shazia   Balance   Balance Comments Requires  "bilat UE support and external assist for safe dynamic mobility   Coordination   Coordination no deficits were identified   General Therapy Interventions   Planned Therapy Interventions bed mobility training;gait training;strengthening;transfer training;progressive activity/exercise   Clinical Impression   Criteria for Skilled Therapeutic Intervention yes, treatment indicated   PT Diagnosis Generalized weakness   Influenced by the following impairments Decreased activity tolerance, global weakness, impaired balance   Functional limitations due to impairments Decreased IND with bed mobility, transfers, ambulation   Clinical Presentation Stable/Uncomplicated   Clinical Presentation Rationale Medically improving per chart.    Clinical Decision Making (Complexity) Low complexity   Therapy Frequency Daily   Predicted Duration of Therapy Intervention (days/wks) 3 days   Anticipated Discharge Disposition Transitional Care Facility   Risk & Benefits of therapy have been explained Yes   Patient, Family & other staff in agreement with plan of care Yes   [facility] [employer] TM \"6 Clicks\"   2016, Trustees of [facility], under license to [employer].  All rights reserved.   6 Clicks Short Forms Basic Mobility Inpatient Short Form   [facility] AM-PAC  \"6 Clicks\" V.2 Basic Mobility Inpatient Short Form   1. Turning from your back to your side while in a flat bed without using bedrails? 3 - A Little   2. Moving from lying on your back to sitting on the side of a flat bed without using bedrails? 3 - A Little   3. Moving to and from a bed to a chair (including a wheelchair)? 3 - A Little   4. Standing up from a chair using your arms (e.g., wheelchair, or bedside chair)? 3 - A Little   5. To walk in hospital room? 3 - A Little   6. Climbing 3-5 steps with a railing? 2 - A Lot   Basic Mobility Raw Score (Score out of 24.Lower scores equate to lower levels of function) 17   Total Evaluation Time   Total Evaluation " Time (Minutes) 5

## 2020-05-12 NOTE — PLAN OF CARE
To Do:  End of Shift Summary  For vital signs and complete assessments, please see documentation flowsheets.     Pertinent assessments: Afebrile.  Up in chair transfer with encouragement --sats increase with coughing to 94% at rest 2l oxymask 91%---remains NPO--  sites clean and intact ---denies nausea and pain     Major Shift Events:- encourage activity and pulmanary movement    Treatment Plan: IV Zosyn, IVF, NPO.

## 2020-05-12 NOTE — PLAN OF CARE
PT: Orders received, evaluation completed and treatment initiated. 64 year old male admitted with acute sigmoid diverticulitis. PMH significant for ETOH abuse. Patient reports he lives alone in a ground floor apartment with no stairs to enter. Reports there is a flight iof stairs to the laundry but family has been helping. Pt typically is as homebody. Family delivers meals. Pt reports he does not drive because of his cataracts. He manages his own medications. Has a cane he will use outside of the apartment.     Discharge Planner PT   Patient plan for discharge: TCU  Current status: Sit>stand with Shazia from recliner chair; slow to stand. Stand>sit with Shazia and cues. Poor eccentric control. Sit>stand from toilet with modA. Pt needs assist with standing pericares and brief mgmt as pt requires bilat UE support on FWW for safe static standing balance. Patient ambulates 15'x2 with FWW and CGA/Shazia. Pt demos forward flexed posture, poor proximity to walker and general unsteadiness. PT cues to stay in walker, provides steadying assist and verbal cues for walker. Min LOB to the L; needs external A and walker to correct.   Barriers to return to prior living situation: Fall risk, lives alone, level of A with transfers and self cares  Recommendations for discharge: TCU  Rationale for recommendations: Pt below baseline for mobility. Pt will need PT in a TCU setting to address strength, balance and activity tolerance deficits prior to returning home.        Entered by: Demond Clemons 05/12/2020 3:11 PM

## 2020-05-12 NOTE — PROGRESS NOTES
Patient tolerated CT guided abscess aspiration by Dr Rivero well under local (lidocaine).  8 ml of blood tinged purlent fluid aspirated.  Dressing applied and remains clean dry and intact at time of transfer to nursing unit via cart.  Report called to receiving RN and sample brought to lab.  Jose Friedman, RN, BSN

## 2020-05-12 NOTE — PLAN OF CARE
End of Shift Summary  For vital signs and complete assessments, please see documentation flowsheets.     Pertinent assessments: Afebrile. Tylenol given for LLQ pain 1x. Denies nausea. 8L oxymask when asleep - 3L when awake. LS expiratory wheezing. Dry cough. Reddened groin. Incontinent of urine. Very weak. BS active. LBM 5/11.     Major Shift Events: 8L oxymask - de-stated to 60's when sleeping. MD aware. Still needing stool sample.   Treatment Plan: IV Zosyn, IVF, NPO.     Discharge Readiness: Medically active  Expected Discharge Date: TBD  Discharge Disposition: TBD  Barriers/Criteria for discharge: IV anti-biotics.

## 2020-05-12 NOTE — PROGRESS NOTES
[facility]    Hospitalist Progress Note    Date of Service (when I saw the patient): 05/12/2020  Provider:  Pawan Arita MD   Text Page  7am - 6PM       Assessment & Plan   Dago Sheikh is a 64 year old male with PMH significant for alcohol abuse, chronic knee and back pain, h/o c diff, diverticulosis, and tobacco abuse who presents with diarrhea and abdominal pain.      ED workup reveals: intermittently hypertensive up to 167/100, mildly hypoxic down to 89% on room air with improvement with 2 L of supplemental oxygen, initially tachycardic, sodium of 131, potassium of 3.1, chloride 92, magnesium of 1.3, albumin of 2.5, total bilirubin of 3.1, alkaline phosphatase of 167, ALT of 101, AST of 156, glucose of 116, hemoglobin of 12.7, UA shows small bilirubin, trace ketones, COVID-19 swab obtained and pending, EtOH level negative, normal lipase, normal total CK, EKG shows rate of 106 bpm in sinus tachycardia with left anterior fascicular block, nonspecific ST abnormality, CT of head negative for acute pathology, and CT of chest/abdomen/pelvis shows no evidence of PE, ectasia of the ascending thoracic aorta measures 4 cm in diameter, marked diffuse fatty infiltration of the liver, cholelithiasis, and findings suspicious for acute sigmoid diverticulitis with a small adjacent diverticular abscess measuring 2.5 cm (likely not amenable to CT-guided drainage due to its small size).      #Acute sigmoid diverticulitis: left and centralized lower abdominal pain for the past month with intermittent nonbloody diarrhea, nausea, and a few episodes of vomiting this past week with increased weakness, decreased appetite and poor PO intake. Per patient he has a prior h/o diverticulosis on colonoscopy performed about 5 years ago but no h/o diverticulitis. Afebrile, no leukocytosis. CT scan shows acute sigmoid diverticulitis with a small adjacent diverticular abscess measuring 2.5 cm (likely not amenable to CT guided  drainage due to its small size). CRP 68, PCT 0.84, ESR 48  - NPO, able to receive medications and ice chips  - IVF hydration with NS at 100 ml/hour  - Supportive care with PRN antiemetics and pain control  - Continue IV Zosyn  - Follow up       #Hx of BARBARA. Hypoxemia overnight.   Not using CPAP in the last 2 years. RT consult, resume here with home settings   #COVID19 PUI ruled otu by pcr: mild hypoxia down to 88% on RA with improvement with 2 liters supplemental oxygen in ED. CT of chest negative for PE and otherwise clear.     - PRN supplemental oxygen, wean as tolerated     #Electrolyte derangement (hyponatremia, hypokalemia, hypomagnesemia, and hypochloremia): likely 2/2 poor PO intake. Corrected.   - IVF hydration with NS at 100 ml/hour  - Replace per protocol.     -Follow-up BMP in AM     #Daily alcohol abuse: reportedly drinks about 2 pints of vodka and 2-3 bottles of wine per week with his last drink the evening of 5/8. Reports no h/o withdrawal or seizures with stopping drinking. Etoh level negative.   - Monitor for signs of withdrawal, if concerns for withdrawal start CIWA protocol    - Daily MVI, folic acid, and thiamine replacements      #Alcoholic Hepatitis: bilirubin and liver enzymes improving.  - Repeat LFTs in AM     #Macrocytic anemia of alcoholism: Hgb 11.5 (12.7), continue to monitor       #Weakness: unable to get up off the floor at his home the last two days preceding admission due to profound generalized weakness. No reported falls. Noted to have mild swelling of left UE per ED provider exam (likely dependent edema from laying on this side). CT of head obtained was negative for acute process. Normal CK.   - Consider PT consult pending mobility with nursing staff     #Tobaccoism. No cravings, declined NRT. Advised to quit.      DVT Prophylaxis: Pneumatic Compression Devices  Code Status: Full Code    Disposition: Expected discharge in 2-3 days once diverticulitis improved, able to have oral  intake.    Interval History   Feels better, no pain at the moment, no fever. Last BM formed stools per RNCHRISTIANO dif order discontinue. Noted hypoxemic overnight, smoker 1/2 ppd, declines NRT. No hands tremor or other signs of RICHARDSON    -Data reviewed today: I reviewed all new labs and imaging results over the last 24 hours.     Physical Exam   Temp: 96  F (35.6  C) Temp src: Oral BP: (Abnormal) 149/80 Pulse: 103 Heart Rate: 95 Resp: 18 SpO2: 95 % O2 Device: Oxymask Oxygen Delivery: 3 LPM  Vitals:    05/11/20 0733   Weight: 99.8 kg (220 lb)     Vital Signs with Ranges  Temp:  [95.7  F (35.4  C)-98.6  F (37  C)] 96  F (35.6  C)  Pulse:  [] 103  Heart Rate:  [] 95  Resp:  [15-23] 18  BP: (133-161)/() 149/80  SpO2:  [60 %-99 %] 95 %  I/O last 3 completed shifts:  In: 1486 [I.V.:1486]  Out: 300 [Urine:300]    GEN: Obese. Alert, oriented x 3, appears comfortable, NAD.  HEENT:  Normocephalic/atraumatic, no scleral icterus, no nasal discharge, mouth moist.  CV:  Regular rate and rhythm, no murmur or JVD.  S1 + S2 noted, no S3 or S4.  LUNGS:  Coarse to auscultation bilaterally with abundabt rhonchi  .  Symmetric chest rise on inhalation noted.  ABD:  Prominent. Hypoactive bowel sounds, soft, non-tender/non-distended.  No rebound/guarding/rigidity.  EXT:  No edema or cyanosis.  No joint synovitis noted.  SKIN:  Dry to touch, no exanthems noted in the visualized areas.       Medications     sodium chloride 100 mL/hr at 05/12/20 0010       piperacillin-tazobactam  3.375 g Intravenous Q6H     sodium chloride (PF)  3 mL Intracatheter Q8H       Data   Recent Labs   Lab 05/12/20  0818 05/12/20  0710 05/11/20  1748 05/11/20  0800   WBC 8.4  --   --  9.6   HGB 11.5*  --   --  12.7*   *  --   --  96   *  --   --  154   INR  --   --   --  0.95   NA  --  136  --  131*   POTASSIUM  --  3.4 3.5 3.1*   CHLORIDE  --  99  --  92*   CO2  --  32  --  32   BUN  --  21  --  22   CR  --  0.72  --  0.79   ANIONGAP  --  5   --  7   SY  --  8.6  --  8.6   GLC  --  84  --  116*   ALBUMIN  --  2.3*  --  2.5*   PROTTOTAL  --  6.6*  --  7.2   BILITOTAL  --  1.9*  --  3.1*   ALKPHOS  --  135  --  167*   ALT  --  85*  --  101*   AST  --  137*  --  156*   LIPASE  --   --   --  107       Recent Results (from the past 24 hour(s))   CT Head w/o Contrast    Narrative    CT OF THE HEAD WITHOUT CONTRAST 5/11/2020 10:00 AM     COMPARISON: None    HISTORY: Fall, left arm weakness.    TECHNIQUE: 5 mm thick axial CT images of the head were acquired  without IV contrast material.    FINDINGS:  There is moderate diffuse cerebral volume loss. There are  subtle patchy areas of decreased density in the cerebral white matter  bilaterally that are consistent with sequela of chronic small vessel  ischemic disease.     The ventricles and basal cisterns are within normal limits in  configuration given the degree of cerebral volume loss.  There is no  midline shift. There are no extra-axial fluid collections.     No intracranial hemorrhage, mass or recent infarct.    The visualized paranasal sinuses are well-aerated. There is no  mastoiditis. There are no fractures of the visualized bones.       Impression    IMPRESSION:  Diffuse cerebral volume loss and cerebral white matter  changes consistent with chronic small vessel ischemic disease. No  evidence for acute intracranial pathology.       Radiation dose for this scan was reduced using automated exposure  control, adjustment of the mA and/or kV according to patient size, or  iterative reconstruction technique.    ZOE PHAN MD   CT Chest (PE) Abdomen Pelvis w Contrast    Narrative    CT CHEST PULMONARY EMBOLUS ABDOMEN AND PELVIS WITH CONTRAST 5/11/2020  10:00 AM    CLINICAL HISTORY: Shortness of breath. Hypoxia. Abdominal pain.  Diarrhea.    TECHNIQUE: CT angiogram chest and routine CT abdomen pelvis with IV  contrast. Arterial phase through the chest and venous phase through  the abdomen and pelvis. 2D and  3D MIP reconstructions were preformed  by the CT technologist. Dose reduction techniques were used.     CONTRAST: 100mL Isovue-370    COMPARISON: None.    FINDINGS:  ANGIOGRAM CHEST: Pulmonary arteries are normal caliber and negative  for pulmonary emboli. Ectasia of the ascending thoracic aorta measures  4 cm in diameter. Atherosclerotic calcification of the thoracic aorta  and coronary arteries. Thoracic aorta is negative for dissection. No  convincing CT evidence of right heart strain.     LUNGS AND PLEURA: No pleural effusions. The lungs are clear.    MEDIASTINUM/AXILLAE: No enlarged lymph nodes are identified in the  chest. Scattered small mediastinal lymph nodes are visible, but do not  meet size criteria for pathologic enlargement. There is mild bilateral  gynecomastia. No pericardial effusion.    HEPATOBILIARY: There is marked diffuse fatty infiltration of the  liver. The liver also appears prominent in size. There is a large  gallstone noted within the gallbladder, measuring 2.6 cm.    PANCREAS: Normal.    SPLEEN: Normal.    ADRENAL GLANDS: Normal.    KIDNEYS/BLADDER: Unremarkable. No hydronephrosis.    BOWEL: Colonic diverticulosis. Hazy increased density and a small  amount of fluid adjacent to the sigmoid colon is suspicious for acute  diverticulitis. There is a small adjacent collection of gas and fluid  (series 20 image 43) measuring 2.5 cm, suspicious for a small  diverticular abscess. No bowel obstruction. Unremarkable appendix.  Small duodenal diverticulum.    LYMPH NODES: No enlarged lymph nodes are identified in the abdomen or  pelvis.    PELVIC ORGANS: Normal.    OTHER: None.    MUSCULOSKELETAL: Degenerative changes are noted in the thoracolumbar  spine.      Impression    IMPRESSION:  1.  Findings are suspicious for acute sigmoid diverticulitis, with a  small probable adjacent diverticular abscess measuring 2.5 cm. This  small probable abscess is likely not amenable to CT-guided drainage  due to  its small size.  2.  No evidence for pulmonary embolism or thoracic aortic dissection.  3.  Ectasia of the ascending thoracic aorta measures 4 cm in diameter.  4.  Marked diffuse fatty infiltration of the liver and probable  hepatomegaly.  5.  Cholelithiasis.     EVELYN BOURGEOIS MD         Disclaimer: This note consists of symbols derived from keyboarding, dictation and/or voice recognition software. As a result, there may be errors in the script that have gone undetected. Please consider this when interpreting information found in this chart.

## 2020-05-12 NOTE — PLAN OF CARE
To Do:  End of Shift Summary  For vital signs and complete assessments, please see documentation flowsheets.     Pertinent assessments: BP's elevated. On 5 LPM O2 oxymask. De-sats to mid 80's when sleeping. Excoriation and yeast in groin. Feet are cracked, flaky, and dry. Voids in urinal.    Major Shift Events: No bm's this shift. K+ re-check 3.4, and MG re-check 1.6. Re-check in AM. COVID-19 negative.    Treatment Plan: IV Zosyn, IVF.     Discharge Readiness: Medically active  Expected Discharge Date: TBD  Discharge Disposition: TBD  Barriers/Criteria for discharge: IV anti-biotics.

## 2020-05-13 LAB
ALBUMIN SERPL-MCNC: 2.1 G/DL (ref 3.4–5)
ALP SERPL-CCNC: 115 U/L (ref 40–150)
ALT SERPL W P-5'-P-CCNC: 75 U/L (ref 0–70)
ANION GAP SERPL CALCULATED.3IONS-SCNC: 5 MMOL/L (ref 3–14)
AST SERPL W P-5'-P-CCNC: 127 U/L (ref 0–45)
BASOPHILS # BLD AUTO: 0.1 10E9/L (ref 0–0.2)
BASOPHILS NFR BLD AUTO: 1.5 %
BILIRUB SERPL-MCNC: 1.6 MG/DL (ref 0.2–1.3)
BUN SERPL-MCNC: 18 MG/DL (ref 7–30)
CALCIUM SERPL-MCNC: 8.3 MG/DL (ref 8.5–10.1)
CHLORIDE SERPL-SCNC: 101 MMOL/L (ref 94–109)
CO2 SERPL-SCNC: 30 MMOL/L (ref 20–32)
CREAT SERPL-MCNC: 0.7 MG/DL (ref 0.66–1.25)
CRP SERPL-MCNC: 40.9 MG/L (ref 0–8)
DIFFERENTIAL METHOD BLD: ABNORMAL
EOSINOPHIL # BLD AUTO: 0.2 10E9/L (ref 0–0.7)
EOSINOPHIL NFR BLD AUTO: 2.5 %
ERYTHROCYTE [DISTWIDTH] IN BLOOD BY AUTOMATED COUNT: 14.8 % (ref 10–15)
GFR SERPL CREATININE-BSD FRML MDRD: >90 ML/MIN/{1.73_M2}
GLUCOSE SERPL-MCNC: 65 MG/DL (ref 70–99)
HCT VFR BLD AUTO: 33.4 % (ref 40–53)
HGB BLD-MCNC: 10.5 G/DL (ref 13.3–17.7)
IMM GRANULOCYTES # BLD: 0.1 10E9/L (ref 0–0.4)
IMM GRANULOCYTES NFR BLD: 1 %
LYMPHOCYTES # BLD AUTO: 1.2 10E9/L (ref 0.8–5.3)
LYMPHOCYTES NFR BLD AUTO: 16.2 %
MCH RBC QN AUTO: 32.6 PG (ref 26.5–33)
MCHC RBC AUTO-ENTMCNC: 31.4 G/DL (ref 31.5–36.5)
MCV RBC AUTO: 104 FL (ref 78–100)
MONOCYTES # BLD AUTO: 0.7 10E9/L (ref 0–1.3)
MONOCYTES NFR BLD AUTO: 9.6 %
NEUTROPHILS # BLD AUTO: 4.9 10E9/L (ref 1.6–8.3)
NEUTROPHILS NFR BLD AUTO: 69.2 %
NRBC # BLD AUTO: 0 10*3/UL
NRBC BLD AUTO-RTO: 0 /100
PLATELET # BLD AUTO: 119 10E9/L (ref 150–450)
POTASSIUM SERPL-SCNC: 3.4 MMOL/L (ref 3.4–5.3)
PROT SERPL-MCNC: 6 G/DL (ref 6.8–8.8)
RBC # BLD AUTO: 3.22 10E12/L (ref 4.4–5.9)
SODIUM SERPL-SCNC: 136 MMOL/L (ref 133–144)
WBC # BLD AUTO: 7.1 10E9/L (ref 4–11)

## 2020-05-13 PROCEDURE — 86140 C-REACTIVE PROTEIN: CPT | Performed by: HOSPITALIST

## 2020-05-13 PROCEDURE — 25800030 ZZH RX IP 258 OP 636: Performed by: PHYSICIAN ASSISTANT

## 2020-05-13 PROCEDURE — 25000128 H RX IP 250 OP 636: Performed by: HOSPITALIST

## 2020-05-13 PROCEDURE — 12000000 ZZH R&B MED SURG/OB

## 2020-05-13 PROCEDURE — 99233 SBSQ HOSP IP/OBS HIGH 50: CPT | Performed by: INTERNAL MEDICINE

## 2020-05-13 PROCEDURE — 36415 COLL VENOUS BLD VENIPUNCTURE: CPT | Performed by: HOSPITALIST

## 2020-05-13 PROCEDURE — 25000132 ZZH RX MED GY IP 250 OP 250 PS 637: Performed by: INTERNAL MEDICINE

## 2020-05-13 PROCEDURE — 25000132 ZZH RX MED GY IP 250 OP 250 PS 637: Performed by: PHYSICIAN ASSISTANT

## 2020-05-13 PROCEDURE — 80053 COMPREHEN METABOLIC PANEL: CPT | Performed by: HOSPITALIST

## 2020-05-13 PROCEDURE — 85025 COMPLETE CBC W/AUTO DIFF WBC: CPT | Performed by: HOSPITALIST

## 2020-05-13 RX ORDER — DEXTROSE MONOHYDRATE 25 G/50ML
25-50 INJECTION, SOLUTION INTRAVENOUS
Status: DISCONTINUED | OUTPATIENT
Start: 2020-05-13 | End: 2020-05-18 | Stop reason: HOSPADM

## 2020-05-13 RX ORDER — POLYETHYLENE GLYCOL 3350 17 G/17G
17 POWDER, FOR SOLUTION ORAL DAILY PRN
Status: DISCONTINUED | OUTPATIENT
Start: 2020-05-13 | End: 2020-05-18 | Stop reason: HOSPADM

## 2020-05-13 RX ORDER — CALCIUM CARBONATE 500 MG/1
1000 TABLET, CHEWABLE ORAL EVERY 4 HOURS PRN
Status: DISCONTINUED | OUTPATIENT
Start: 2020-05-13 | End: 2020-05-18 | Stop reason: HOSPADM

## 2020-05-13 RX ORDER — NICOTINE POLACRILEX 4 MG
15-30 LOZENGE BUCCAL
Status: DISCONTINUED | OUTPATIENT
Start: 2020-05-13 | End: 2020-05-18 | Stop reason: HOSPADM

## 2020-05-13 RX ADMIN — TAZOBACTAM SODIUM AND PIPERACILLIN SODIUM 3.38 G: 375; 3 INJECTION, SOLUTION INTRAVENOUS at 20:07

## 2020-05-13 RX ADMIN — ACETAMINOPHEN 650 MG: 325 TABLET, FILM COATED ORAL at 14:01

## 2020-05-13 RX ADMIN — CALCIUM CARBONATE (ANTACID) CHEW TAB 500 MG 1000 MG: 500 CHEW TAB at 13:14

## 2020-05-13 RX ADMIN — SODIUM CHLORIDE: 9 INJECTION, SOLUTION INTRAVENOUS at 08:01

## 2020-05-13 RX ADMIN — OXYCODONE HYDROCHLORIDE 5 MG: 5 TABLET ORAL at 20:04

## 2020-05-13 RX ADMIN — OXYCODONE HYDROCHLORIDE 5 MG: 5 TABLET ORAL at 13:59

## 2020-05-13 RX ADMIN — TAZOBACTAM SODIUM AND PIPERACILLIN SODIUM 3.38 G: 375; 3 INJECTION, SOLUTION INTRAVENOUS at 08:02

## 2020-05-13 RX ADMIN — TAZOBACTAM SODIUM AND PIPERACILLIN SODIUM 3.38 G: 375; 3 INJECTION, SOLUTION INTRAVENOUS at 14:01

## 2020-05-13 RX ADMIN — ACETAMINOPHEN 650 MG: 325 TABLET, FILM COATED ORAL at 20:04

## 2020-05-13 RX ADMIN — TAZOBACTAM SODIUM AND PIPERACILLIN SODIUM 3.38 G: 375; 3 INJECTION, SOLUTION INTRAVENOUS at 03:13

## 2020-05-13 RX ADMIN — SODIUM CHLORIDE: 9 INJECTION, SOLUTION INTRAVENOUS at 20:05

## 2020-05-13 ASSESSMENT — ACTIVITIES OF DAILY LIVING (ADL)
ADLS_ACUITY_SCORE: 17
ADLS_ACUITY_SCORE: 15
ADLS_ACUITY_SCORE: 17
ADLS_ACUITY_SCORE: 17

## 2020-05-13 NOTE — PROGRESS NOTES
Discharge Planner   Discharge Plans in progress: Met with pt today to update that SW is having difficult time obtaining benefits.  Per Kurt[facility], pt is still listed in their system at living in [facility].  Pt will need to somehow update his information. SW did complete an on line form and faxed back    Barriers to discharge plan: PT is not feeling well today an more inclind to agree to TCU.. At this time SW still unsure of coverage.. Pt does have Dr. Hathaway at the [facility]   Called Nanette - she was able to provide RN number for clinic [place] [phone]  Follow up plan: Not able to reach clinic .  Called NP Fly P: [phone]  F: [phone]        Will continue to follow for support    Corinne White \[facility]\""  Inpatient Care Coordination   [phone]  [facility]     Will send referrals to see who would be able      SW was able to speak with pt's wife today. Xenia [phone],. She is still living in [place].. The plan was for pt to come to here to [place] for medical for his back pain and other issues and return home..   SW now has pt's primary address [address] [place] [id number]  SW will call [facility] with updated info to see if he would have coverage for TCU or HC in [place] as [place] [facility]          Entered by: Corinne C. White 05/13/2020 2:31 PM

## 2020-05-13 NOTE — PROGRESS NOTES
CPAP at bedtime was ordered for patient with sleep apnea. Patient had no home CPAP and stated he hasn't been uisng for while. Hospital CPAP was offered but patient refused. Will continue to assess.     Junior Mathis, RT

## 2020-05-13 NOTE — PROGRESS NOTES
[facility]    Hospitalist Progress Note  Provider : Ramone Schneider MD  Date of Service (when I saw the patient): 05/13/2020    Assessment & Plan      Dago Sheikh is a 64 year old male with PMH significant for alcohol abuse, chronic knee and back pain, h/o c diff, diverticulosis, and tobacco abuse who presents with diarrhea and abdominal pain.   -ED workup reveals: intermittently hypertensive up to 167/100, mildly hypoxic down to 89% on room air with improvement with 2 L of supplemental oxygen, initially tachycardic, sodium of 131, potassium of 3.1, chloride 92, magnesium of 1.3, albumin of 2.5, total bilirubin of 3.1, alkaline phosphatase of 167, ALT of 101, AST of 156, glucose of 116, hemoglobin of 12.7, UA shows small bilirubin, trace ketones, COVID-19 swab obtained and pending, EtOH level negative, normal lipase, normal total CK, EKG shows rate of 106 bpm in sinus tachycardia with left anterior fascicular block, nonspecific ST abnormality, CT of head negative for acute pathology, and CT of chest/abdomen/pelvis shows no evidence of PE, ectasia of the ascending thoracic aorta measures 4 cm in diameter, marked diffuse fatty infiltration of the liver, cholelithiasis, and findings suspicious for acute sigmoid diverticulitis with a small adjacent diverticular abscess measuring 2.5 cm (likely not amenable to CT-guided drainage due to its small size).      1. Acute sigmoid diverticulitis: left and centralized lower abdominal pain for the past month with intermittent nonbloody diarrhea, nausea, and a few episodes of vomiting this past week with increased weakness, decreased appetite and poor PO intake.   -Has prior history of diverticulosis on colonoscopy performed about 5 years ago but no h/o diverticulitis. Afebrile, no leukocytosis.   -CT scan showed acute sigmoid diverticulitis with a small adjacent diverticular abscess measuring 2.5 cm (likely not amenable to CT guided drainage due to its  small size). CRP 68, PCT 0.84, ESR 48  -Started on clears per colorectal surgey  -Continue IVF hydration with NS at 100 ml/hour  -Supportive care with PRN antiemetics and pain control  -Continue IV Zosyn     2. History of BARBARA.   -Was hypoxic on admission  -Not using CPAP in the last 2 years. RT consult, resume here with home settings   -COVID-19 negative  -PRN supplemental oxygen, wean as tolerated     3. Electrolyte derangement (hyponatremia, hypokalemia, hypomagnesemia, and hypochloremia): likely 2/2 poor PO intake. Corrected.   -IVF hydration with NS at 100 ml/hour  -Replace per protocol.     -Follow-up BMP in AM     4. Daily alcohol abuse: reportedly drinks about 2 pints of vodka and 2-3 bottles of wine per week with his last drink the evening of 5/8. Reports no h/o withdrawal or seizures with stopping drinking. Etoh level negative.   -Monitor for signs of withdrawal, if concerns for withdrawal start CIWA protocol    -Daily MVI, folic acid, and thiamine replacements      5. Alcoholic Hepatitis: bilirubin and liver enzymes improving.  - Repeat LFTs in AM     6. Macrocytic anemia of alcoholism: Hgb 11.5 (12.7), continue to monitor       7. Weakness: unable to get up off the floor at his home the last two days preceding admission due to profound generalized weakness. No reported falls. Noted to have mild swelling of left UE per ED provider exam (likely dependent edema from laying on this side). CT of head obtained was negative for acute process. Normal CK.   - Consider PT consult pending mobility with nursing staff      8. Tobaccoism. No cravings, declined NRT. Advised to quit.     DVT Prophylaxis: Pneumatic Compression Devices    Code Status: Full Code    Disposition: Expected discharge in 2-3 days once diverticulitis improved, able to have oral intake    Ramone Schneider MD    Interval History   Patient seen and examined. He stated that he is feeling better. Pain improving. No vomiting    -Data reviewed today: I  reviewed all new labs and imaging results over the last 24 hours. I personally reviewed    Physical Exam   Temp: 98.5  F (36.9  C) Temp src: Oral BP: (!) 152/72   Heart Rate: 82 Resp: 16 SpO2: 96 % O2 Device: Oxymask Oxygen Delivery: 2 LPM  Vitals:    05/11/20 0733   Weight: 99.8 kg (220 lb)     Vital Signs with Ranges  Temp:  [98  F (36.7  C)-98.5  F (36.9  C)] 98.5  F (36.9  C)  Heart Rate:  [] 82  Resp:  [16-20] 16  BP: (126-152)/(69-76) 152/72  SpO2:  [92 %-97 %] 96 %  I/O last 3 completed shifts:  In: 1465 [I.V.:1465]  Out: 325 [Urine:325]    GEN:  Alert, oriented x 3, appears comfortable, NAD.  HEENT:  Normocephalic/atraumatic, no scleral icterus, no nasal discharge, mouth moist.  CV:  Regular rate and rhythm, no murmur or JVD.  S1 + S2 noted, no S3 or S4.  LUNGS:  Clear to auscultation bilaterally without rales/rhonchi/wheezing/retractions.  Symmetric chest rise on inhalation noted.  ABD:  Active bowel sounds, soft, non-tender/non-distended.  No rebound/guarding/rigidity.  EXT:  No edema or cyanosis.  Hands/feet warm to touch with good signs of peripheral perfusion.  No joint synovitis noted.  SKIN:  Dry to touch, no exanthems noted in the visualized areas.  NEURO:  Symmetric muscle strength, sensation to touch grossly intact.  No new focal deficits appreciated.    Medications     sodium chloride 100 mL/hr at 05/13/20 0801       piperacillin-tazobactam  3.375 g Intravenous Q6H     sodium chloride (PF)  3 mL Intracatheter Q8H       Data   Recent Labs   Lab 05/13/20  0650 05/12/20  0818 05/12/20  0710 05/11/20  1748 05/11/20  0800   WBC 7.1 8.4  --   --  9.6   HGB 10.5* 11.5*  --   --  12.7*   * 104*  --   --  96   * 124*  --   --  154   INR  --   --   --   --  0.95     --  136  --  131*   POTASSIUM 3.4  --  3.4 3.5 3.1*   CHLORIDE 101  --  99  --  92*   CO2 30  --  32  --  32   BUN 18  --  21  --  22   CR 0.70  --  0.72  --  0.79   ANIONGAP 5  --  5  --  7   SY 8.3*  --  8.6  --  8.6    GLC 65*  --  84  --  116*   ALBUMIN 2.1*  --  2.3*  --  2.5*   PROTTOTAL 6.0*  --  6.6*  --  7.2   BILITOTAL 1.6*  --  1.9*  --  3.1*   ALKPHOS 115  --  135  --  167*   ALT 75*  --  85*  --  101*   *  --  137*  --  156*   LIPASE  --   --   --   --  107       Recent Results (from the past 24 hour(s))   CT Abdomen Peritoneum Abscess Aspiration    Narrative    PROCEDURE: CT guided abscess aspiration.     DATE: 5/12/2020    MEDICATIONS: 1% lidocaine. Throughout the procedure, the patient was  monitored by a radiology nurse for cardiac rhythm and oxygen  saturation which remained stable.    CONTRAST: None.    COMPLICATIONS: None    HISTORY: Diverticulitis with small abscess.    PROCEDURE AND FINDINGS: Following a discussion of the risks, benefits,  indications and alternatives to treatment, appropriate informed  consent was obtained.  The patient was brought to the CT department  and placed supine on the table. The right abdomen was prepped and  draped in a sterile fashion.  A time out was performed per universal  protocol policy to ensure correct patient, site and procedure to be  performed. Radiation dose for this scan was reduced using automated  exposure control, adjustment of the mA and/or kV according to patient  size, or iterative reconstruction technique.     5 mm unenhanced images were obtained through the region of the  abdomen, images showed a small abscess. A suitable route for  percutaneous drainage was then chosen. The overlying skin was prepped  and draped in a sterile manner. 1% lidocaine was used for local  anesthesia. Under CT guidance, 5 Greenlandic Yueh needle was advanced to  the abscess. The catheter was advanced off of the needle. 8 mL of  purulent fluid was aspirated and sent to lab for culture. The catheter  was removed and a sterile dressing was applied.     Throughout the procedure, the patient was monitored by a radiology  nurse for cardiac rhythm and oxygen saturation which remained  stable.  The patient tolerated the procedure well and left interventional  radiology in stable condition.      Impression    IMPRESSION: CT-guided abdominal abscess aspiration.    KAMRYN TUTTLE DO

## 2020-05-13 NOTE — PLAN OF CARE
PT: Treatment attempted. Pt declines as he would like to time to wake up and have fluids. Agreeable to PT check back if able.

## 2020-05-13 NOTE — PLAN OF CARE
To Do:  End of Shift Summary  For vital signs and complete assessments, please see documentation flowsheets.     Pertinent assessments: sats 91-93% on room air -- c/o left side pain Tylenol and Oxycodone x 1 given with relief. Using urinal at bedside.   up    Major Shift Events encouraged to use IS -- up to chair and walked to bathroom -- tolerating clear liquids   Treatment Plan: IVF, Zosyn

## 2020-05-13 NOTE — CONSULTS
Care Transition Initial Assessment -      Met with: Patient    Active Problems:    Sigmoid diverticulitis       DATA  Lives With: alone   Living Arrangements: apartment- ground floor   Quality of Family Relationships: helpful, involved  Description of Support System: Involved  Who is your support system?: Children- family are dropping of meals   Support Assessment: Lacks adequate physical care. Pt seems isolated.  Has limited support when family not present. [facility] at [facility].    Identified issues/concerns regarding health management: Admitted for weakness, Sigmoid Diverticulitis.   Has new drain placement    @      Quality of Family Relationships: helpful, involved  Transportation Anticipated: family or friend will provide  Pt not sure nut would call family   ASSESSMENT  Cognitive Status:  awake, alert and oriented- [facility]   Concerns to be addressed: Met with pt. He is alert, did not share much.  [facility].. Unable to offer name of provider.  Has not seen any providers outside the  [facility]  PT has a cane at home. No other DME  He hopes to return home if possible. All new home care orders will have to be done through his clinic at the [facility] .     PLAN  Will [facility] to [facility] today to try and connect with his RN/<MD at his clinic for support.     Corinne White [facility]  Inpatient Care Coordination   [phone]  [facility]

## 2020-05-13 NOTE — PROGRESS NOTES
End of Shift Summary  For vital signs and complete assessments, please see documentation flowsheets.     Pertinent assessments: slightly elevated BP, currently on 2L with oxy mask, c/o left side pain Tylenol and Oxycodone x 1 given with relief. Using urinal at bedside. LS coarse, exp wheezing.    Major Shift Events   Treatment Plan: IVF, Zosyn, NPO

## 2020-05-13 NOTE — PROGRESS NOTES
COLON & RECTAL SURGERY  PROGRESS NOTE    May 13, 2020    SUBJECTIVE:  Pt resting in bed on arrival. No acute events overnight. Denies significant abdominal pain. No nausea. Passing gas.     OBJECTIVE:  Temp:  [98  F (36.7  C)-98.5  F (36.9  C)] 98.5  F (36.9  C)  Heart Rate:  [] 82  Resp:  [16-20] 16  BP: (126-152)/(69-76) 152/72  SpO2:  [92 %-97 %] 96 %    Intake/Output Summary (Last 24 hours) at 5/13/2020 0944  Last data filed at 5/13/2020 0610  Gross per 24 hour   Intake 1465 ml   Output 325 ml   Net 1140 ml       GENERAL:  Awake, alert, no acute distress  HEAD: Normocephalic atraumatic  SCLERA: Anicteric  EXTREMITIES: Warm and well perfused  ABDOMEN:  Soft, appropriately tender, mildly distended. No guarding, rigidity, or peritoneal signs.   RECTAL: HENRIQUE performed. Palpable stool in rectal vault, pebble-like. No obvious masses palpated. No blood on finger upon extraction.     LABS:  Lab Results   Component Value Date    WBC 7.1 05/13/2020     Lab Results   Component Value Date    HGB 10.5 05/13/2020     Lab Results   Component Value Date    HCT 33.4 05/13/2020     Lab Results   Component Value Date     05/13/2020     Last Basic Metabolic Panel:  Lab Results   Component Value Date     05/13/2020      Lab Results   Component Value Date    POTASSIUM 3.4 05/13/2020     Lab Results   Component Value Date    CHLORIDE 101 05/13/2020     Lab Results   Component Value Date    SY 8.3 05/13/2020     Lab Results   Component Value Date    CO2 30 05/13/2020     Lab Results   Component Value Date    BUN 18 05/13/2020     Lab Results   Component Value Date    CR 0.70 05/13/2020     Lab Results   Component Value Date    GLC 65 05/13/2020       ASSESSMENT/PLAN: 64 year old man with heavy alcohol use and smoking now with about 1 month of diarrhea for which he was taking imodium. More recently constipation and abdominal pain. CT showed a small pericolonic collection and inflammation of the sigmoid colon. This was  aspirated yesterday. Currently, AVSS. No nausea. Pain controlled. No urgent plans for surgery, will follow. Colonoscopy recommended once acute episode resolves.     1. Clear liquid diet. Miralax PRN for hard stools.   2. PRN pain medication, minimize narcotics  3. Continue IVF  4.  Continue IV abx  5. OOB, ambulate QID  6. Plan will be discussed with Dr Pederson    For questions/paging, please contact the CRS office at [phone].    Tammi Elliott PA-C  [facility]  Phone: [phone]

## 2020-05-14 ENCOUNTER — APPOINTMENT (OUTPATIENT)
Dept: CT IMAGING | Facility: CLINIC | Age: 65
DRG: 391 | End: 2020-05-14
Attending: HOSPITALIST
Payer: OTHER GOVERNMENT

## 2020-05-14 LAB
AMMONIA PLAS-SCNC: 31 UMOL/L (ref 10–50)
ANION GAP SERPL CALCULATED.3IONS-SCNC: 11 MMOL/L (ref 3–14)
ANION GAP SERPL CALCULATED.3IONS-SCNC: 9 MMOL/L (ref 3–14)
BASE DEFICIT BLDA-SCNC: 3.8 MMOL/L
BUN SERPL-MCNC: 14 MG/DL (ref 7–30)
BUN SERPL-MCNC: 16 MG/DL (ref 7–30)
C DIFF TOX B STL QL: NEGATIVE
CALCIUM SERPL-MCNC: 8.1 MG/DL (ref 8.5–10.1)
CALCIUM SERPL-MCNC: 8.3 MG/DL (ref 8.5–10.1)
CHLORIDE SERPL-SCNC: 101 MMOL/L (ref 94–109)
CHLORIDE SERPL-SCNC: 102 MMOL/L (ref 94–109)
CK SERPL-CCNC: 110 U/L (ref 30–300)
CO2 SERPL-SCNC: 24 MMOL/L (ref 20–32)
CO2 SERPL-SCNC: 26 MMOL/L (ref 20–32)
CREAT SERPL-MCNC: 0.62 MG/DL (ref 0.66–1.25)
CREAT SERPL-MCNC: 0.66 MG/DL (ref 0.66–1.25)
ERYTHROCYTE [DISTWIDTH] IN BLOOD BY AUTOMATED COUNT: 14.7 % (ref 10–15)
ERYTHROCYTE [DISTWIDTH] IN BLOOD BY AUTOMATED COUNT: 14.8 % (ref 10–15)
GFR SERPL CREATININE-BSD FRML MDRD: >90 ML/MIN/{1.73_M2}
GFR SERPL CREATININE-BSD FRML MDRD: >90 ML/MIN/{1.73_M2}
GLUCOSE BLDC GLUCOMTR-MCNC: 104 MG/DL (ref 70–99)
GLUCOSE SERPL-MCNC: 104 MG/DL (ref 70–99)
GLUCOSE SERPL-MCNC: 79 MG/DL (ref 70–99)
HCO3 BLD-SCNC: 22 MMOL/L (ref 21–28)
HCT VFR BLD AUTO: 33.8 % (ref 40–53)
HCT VFR BLD AUTO: 36.1 % (ref 40–53)
HGB BLD-MCNC: 10.9 G/DL (ref 13.3–17.7)
HGB BLD-MCNC: 11.5 G/DL (ref 13.3–17.7)
LACTATE BLD-SCNC: 1.5 MMOL/L (ref 0.7–2)
MCH RBC QN AUTO: 32.4 PG (ref 26.5–33)
MCH RBC QN AUTO: 32.5 PG (ref 26.5–33)
MCHC RBC AUTO-ENTMCNC: 31.9 G/DL (ref 31.5–36.5)
MCHC RBC AUTO-ENTMCNC: 32.2 G/DL (ref 31.5–36.5)
MCV RBC AUTO: 101 FL (ref 78–100)
MCV RBC AUTO: 102 FL (ref 78–100)
O2/TOTAL GAS SETTING VFR VENT: 4 %
OXYHGB MFR BLD: 94 % (ref 92–100)
PCO2 BLD: 44 MM HG (ref 35–45)
PH BLD: 7.31 PH (ref 7.35–7.45)
PLATELET # BLD AUTO: 143 10E9/L (ref 150–450)
PLATELET # BLD AUTO: 172 10E9/L (ref 150–450)
PO2 BLD: 91 MM HG (ref 80–105)
POTASSIUM SERPL-SCNC: 3.3 MMOL/L (ref 3.4–5.3)
POTASSIUM SERPL-SCNC: 3.7 MMOL/L (ref 3.4–5.3)
RBC # BLD AUTO: 3.35 10E12/L (ref 4.4–5.9)
RBC # BLD AUTO: 3.55 10E12/L (ref 4.4–5.9)
SODIUM SERPL-SCNC: 136 MMOL/L (ref 133–144)
SODIUM SERPL-SCNC: 137 MMOL/L (ref 133–144)
SPECIMEN SOURCE: NORMAL
TROPONIN I SERPL-MCNC: 0.02 UG/L (ref 0–0.04)
WBC # BLD AUTO: 6.3 10E9/L (ref 4–11)
WBC # BLD AUTO: 7.4 10E9/L (ref 4–11)

## 2020-05-14 PROCEDURE — 25800030 ZZH RX IP 258 OP 636: Performed by: PHYSICIAN ASSISTANT

## 2020-05-14 PROCEDURE — 80048 BASIC METABOLIC PNL TOTAL CA: CPT | Performed by: HOSPITALIST

## 2020-05-14 PROCEDURE — 36415 COLL VENOUS BLD VENIPUNCTURE: CPT | Performed by: HOSPITALIST

## 2020-05-14 PROCEDURE — 80048 BASIC METABOLIC PNL TOTAL CA: CPT | Performed by: INTERNAL MEDICINE

## 2020-05-14 PROCEDURE — 82550 ASSAY OF CK (CPK): CPT | Performed by: HOSPITALIST

## 2020-05-14 PROCEDURE — 40000275 ZZH STATISTIC RCP TIME EA 10 MIN

## 2020-05-14 PROCEDURE — 85027 COMPLETE CBC AUTOMATED: CPT | Performed by: HOSPITALIST

## 2020-05-14 PROCEDURE — 99232 SBSQ HOSP IP/OBS MODERATE 35: CPT | Performed by: INTERNAL MEDICINE

## 2020-05-14 PROCEDURE — 84484 ASSAY OF TROPONIN QUANT: CPT | Performed by: HOSPITALIST

## 2020-05-14 PROCEDURE — HZ2ZZZZ DETOXIFICATION SERVICES FOR SUBSTANCE ABUSE TREATMENT: ICD-10-PCS | Performed by: HOSPITALIST

## 2020-05-14 PROCEDURE — 83605 ASSAY OF LACTIC ACID: CPT | Performed by: HOSPITALIST

## 2020-05-14 PROCEDURE — 25000128 H RX IP 250 OP 636: Performed by: HOSPITALIST

## 2020-05-14 PROCEDURE — 25000128 H RX IP 250 OP 636

## 2020-05-14 PROCEDURE — 87493 C DIFF AMPLIFIED PROBE: CPT | Performed by: PHYSICIAN ASSISTANT

## 2020-05-14 PROCEDURE — 82140 ASSAY OF AMMONIA: CPT | Performed by: HOSPITALIST

## 2020-05-14 PROCEDURE — 25000132 ZZH RX MED GY IP 250 OP 250 PS 637: Performed by: PHYSICIAN ASSISTANT

## 2020-05-14 PROCEDURE — 25000132 ZZH RX MED GY IP 250 OP 250 PS 637: Performed by: HOSPITALIST

## 2020-05-14 PROCEDURE — 85027 COMPLETE CBC AUTOMATED: CPT | Performed by: INTERNAL MEDICINE

## 2020-05-14 PROCEDURE — 84146 ASSAY OF PROLACTIN: CPT | Performed by: HOSPITALIST

## 2020-05-14 PROCEDURE — 70450 CT HEAD/BRAIN W/O DYE: CPT

## 2020-05-14 PROCEDURE — 36600 WITHDRAWAL OF ARTERIAL BLOOD: CPT

## 2020-05-14 PROCEDURE — 12000000 ZZH R&B MED SURG/OB

## 2020-05-14 PROCEDURE — 36415 COLL VENOUS BLD VENIPUNCTURE: CPT | Performed by: INTERNAL MEDICINE

## 2020-05-14 PROCEDURE — 00000146 ZZHCL STATISTIC GLUCOSE BY METER IP

## 2020-05-14 PROCEDURE — 82805 BLOOD GASES W/O2 SATURATION: CPT | Performed by: INTERNAL MEDICINE

## 2020-05-14 RX ORDER — POTASSIUM CHLORIDE 1500 MG/1
20-40 TABLET, EXTENDED RELEASE ORAL
Status: DISCONTINUED | OUTPATIENT
Start: 2020-05-14 | End: 2020-05-18 | Stop reason: HOSPADM

## 2020-05-14 RX ORDER — POTASSIUM CL/LIDO/0.9 % NACL 10MEQ/0.1L
10 INTRAVENOUS SOLUTION, PIGGYBACK (ML) INTRAVENOUS
Status: DISCONTINUED | OUTPATIENT
Start: 2020-05-14 | End: 2020-05-18 | Stop reason: HOSPADM

## 2020-05-14 RX ORDER — POTASSIUM CHLORIDE 29.8 MG/ML
20 INJECTION INTRAVENOUS
Status: DISCONTINUED | OUTPATIENT
Start: 2020-05-14 | End: 2020-05-18 | Stop reason: HOSPADM

## 2020-05-14 RX ORDER — MULTIPLE VITAMINS W/ MINERALS TAB 9MG-400MCG
1 TAB ORAL DAILY
Status: DISCONTINUED | OUTPATIENT
Start: 2020-05-14 | End: 2020-05-18 | Stop reason: HOSPADM

## 2020-05-14 RX ORDER — POTASSIUM CHLORIDE 7.45 MG/ML
10 INJECTION INTRAVENOUS
Status: DISCONTINUED | OUTPATIENT
Start: 2020-05-14 | End: 2020-05-18 | Stop reason: HOSPADM

## 2020-05-14 RX ORDER — CLONIDINE HYDROCHLORIDE 0.1 MG/1
0.1 TABLET ORAL 2 TIMES DAILY
Status: DISCONTINUED | OUTPATIENT
Start: 2020-05-14 | End: 2020-05-17

## 2020-05-14 RX ORDER — FOLIC ACID 1 MG/1
1 TABLET ORAL DAILY
Status: DISCONTINUED | OUTPATIENT
Start: 2020-05-14 | End: 2020-05-18 | Stop reason: HOSPADM

## 2020-05-14 RX ORDER — LORAZEPAM 2 MG/ML
1-2 INJECTION INTRAMUSCULAR EVERY 30 MIN PRN
Status: DISCONTINUED | OUTPATIENT
Start: 2020-05-14 | End: 2020-05-17

## 2020-05-14 RX ORDER — LORAZEPAM 1 MG/1
1-2 TABLET ORAL EVERY 30 MIN PRN
Status: DISCONTINUED | OUTPATIENT
Start: 2020-05-14 | End: 2020-05-17

## 2020-05-14 RX ORDER — NALOXONE HYDROCHLORIDE 0.4 MG/ML
INJECTION, SOLUTION INTRAMUSCULAR; INTRAVENOUS; SUBCUTANEOUS
Status: COMPLETED
Start: 2020-05-14 | End: 2020-05-14

## 2020-05-14 RX ORDER — LANOLIN ALCOHOL/MO/W.PET/CERES
100 CREAM (GRAM) TOPICAL DAILY
Status: COMPLETED | OUTPATIENT
Start: 2020-05-14 | End: 2020-05-18

## 2020-05-14 RX ORDER — MAGNESIUM SULFATE HEPTAHYDRATE 40 MG/ML
4 INJECTION, SOLUTION INTRAVENOUS EVERY 4 HOURS PRN
Status: DISCONTINUED | OUTPATIENT
Start: 2020-05-14 | End: 2020-05-18 | Stop reason: HOSPADM

## 2020-05-14 RX ORDER — POTASSIUM CHLORIDE 1.5 G/1.58G
20-40 POWDER, FOR SOLUTION ORAL
Status: DISCONTINUED | OUTPATIENT
Start: 2020-05-14 | End: 2020-05-18 | Stop reason: HOSPADM

## 2020-05-14 RX ADMIN — ACETAMINOPHEN 650 MG: 325 TABLET, FILM COATED ORAL at 09:12

## 2020-05-14 RX ADMIN — TAZOBACTAM SODIUM AND PIPERACILLIN SODIUM 3.38 G: 375; 3 INJECTION, SOLUTION INTRAVENOUS at 03:17

## 2020-05-14 RX ADMIN — OXYCODONE HYDROCHLORIDE 5 MG: 5 TABLET ORAL at 00:16

## 2020-05-14 RX ADMIN — OXYCODONE HYDROCHLORIDE 5 MG: 5 TABLET ORAL at 10:16

## 2020-05-14 RX ADMIN — POTASSIUM CHLORIDE 20 MEQ: 1500 TABLET, EXTENDED RELEASE ORAL at 22:36

## 2020-05-14 RX ADMIN — NALXONE HYDROCHLORIDE 0.4 MG: 0.4 INJECTION INTRAMUSCULAR; INTRAVENOUS; SUBCUTANEOUS at 17:54

## 2020-05-14 RX ADMIN — FOLIC ACID 1 MG: 1 TABLET ORAL at 19:53

## 2020-05-14 RX ADMIN — TAZOBACTAM SODIUM AND PIPERACILLIN SODIUM 3.38 G: 375; 3 INJECTION, SOLUTION INTRAVENOUS at 21:14

## 2020-05-14 RX ADMIN — OXYCODONE HYDROCHLORIDE 5 MG: 5 TABLET ORAL at 14:28

## 2020-05-14 RX ADMIN — ACETAMINOPHEN 650 MG: 325 TABLET, FILM COATED ORAL at 19:53

## 2020-05-14 RX ADMIN — MULTIPLE VITAMINS W/ MINERALS TAB 1 TABLET: TAB at 19:54

## 2020-05-14 RX ADMIN — SODIUM CHLORIDE: 9 INJECTION, SOLUTION INTRAVENOUS at 07:17

## 2020-05-14 RX ADMIN — ACETAMINOPHEN 650 MG: 325 TABLET, FILM COATED ORAL at 00:16

## 2020-05-14 RX ADMIN — TAZOBACTAM SODIUM AND PIPERACILLIN SODIUM 3.38 G: 375; 3 INJECTION, SOLUTION INTRAVENOUS at 09:08

## 2020-05-14 RX ADMIN — OXYCODONE HYDROCHLORIDE 5 MG: 5 TABLET ORAL at 09:12

## 2020-05-14 RX ADMIN — TAZOBACTAM SODIUM AND PIPERACILLIN SODIUM 3.38 G: 375; 3 INJECTION, SOLUTION INTRAVENOUS at 14:28

## 2020-05-14 RX ADMIN — THIAMINE HCL TAB 100 MG 100 MG: 100 TAB at 19:53

## 2020-05-14 RX ADMIN — POTASSIUM CHLORIDE 40 MEQ: 1500 TABLET, EXTENDED RELEASE ORAL at 20:27

## 2020-05-14 RX ADMIN — SODIUM CHLORIDE: 9 INJECTION, SOLUTION INTRAVENOUS at 19:53

## 2020-05-14 RX ADMIN — ACETAMINOPHEN 650 MG: 325 TABLET, FILM COATED ORAL at 14:27

## 2020-05-14 RX ADMIN — CLONIDINE HYDROCHLORIDE 0.1 MG: 0.1 TABLET ORAL at 21:13

## 2020-05-14 ASSESSMENT — ACTIVITIES OF DAILY LIVING (ADL)
ADLS_ACUITY_SCORE: 17
ADLS_ACUITY_SCORE: 15
ADLS_ACUITY_SCORE: 17
ADLS_ACUITY_SCORE: 17
ADLS_ACUITY_SCORE: 15
ADLS_ACUITY_SCORE: 17

## 2020-05-14 ASSESSMENT — MIFFLIN-ST. JEOR: SCORE: 1864.48

## 2020-05-14 NOTE — PLAN OF CARE
Patient alert and oriented  Abdominal pain max 8/10- tylenol and oxy given  BP elevated, other VSS, on room air  Up assist x1 with walker and gait belt  Frequent loose stools, incontinent at times  Last BM today- C diff pending  Bedside urinal in use, kit urine  Lungs clear, denies SOB  BLE edema +1  Aspiration site CDI  Continuing IV zosyn  Discharge TBD  Continue with plan of care

## 2020-05-14 NOTE — PLAN OF CARE
Major Shift Events encouraged to use IS -- up to chair and walked to bathroom -- tolerating clear liquids   Treatment Plan: IVF, Zosyn    Discharge Readiness: Medically active  Expected Discharge Date: TBD  Discharge Disposition: Home with Self care  Barriers/Criteria for discharge

## 2020-05-14 NOTE — PLAN OF CARE
"PT: Treatment attempted; declines as he has \"already been up\" today. Encouraged for OOB mobility to improve strength. He continues to decline due to diarrhea. Will continue to follow per POC.   "

## 2020-05-14 NOTE — PROGRESS NOTES
[facility]  Hospitalist Progress Note    Assessment & Plan   Dago Sheikh is a 64 year old male with PMH significant for alcohol abuse, chronic knee and back pain, h/o c diff, diverticulosis, and tobacco abuse who presents with diarrhea and abdominal pain.     ED workup reveals: intermittently hypertensive up to 167/100, mildly hypoxic down to 89% on room air with improvement with 2 L of supplemental oxygen, initially tachycardic, sodium of 131, potassium of 3.1, chloride 92, magnesium of 1.3, albumin of 2.5, total bilirubin of 3.1, alkaline phosphatase of 167, ALT of 101, AST of 156, glucose of 116, hemoglobin of 12.7, UA shows small bilirubin, trace ketones, COVID-19 swab obtained and pending, EtOH level negative, normal lipase, normal total CK, EKG shows rate of 106 bpm in sinus tachycardia with left anterior fascicular block, nonspecific ST abnormality, CT of head negative for acute pathology, and CT of chest/abdomen/pelvis shows no evidence of PE, ectasia of the ascending thoracic aorta measures 4 cm in diameter, marked diffuse fatty infiltration of the liver, cholelithiasis, and findings suspicious for acute sigmoid diverticulitis with a small adjacent diverticular abscess measuring 2.5 cm.     Colorectal surgery consulted and IR completed aspiration of the abscess. Cultures grew E. Coli. Started on IV Zosyn. Increased frequent diarrheas and stool sent for C. Diff.     Acute sigmoid diverticulitis with abscess. E. Coli.   Left and centralized lower abdominal pain for the past month with intermittent nonbloody diarrhea, nausea, and a few episodes of vomiting this past week with increased weakness, decreased appetite and poor PO intake. Has prior history of diverticulosis on colonoscopy performed about 5 years ago but no h/o diverticulitis. Afebrile, no leukocytosis. CT scan showed acute sigmoid diverticulitis with a small adjacent diverticular abscess measuring 2.5 cm (likely not amenable to CT  guided drainage due to its small size). CRP 68, PCT 0.84, ESR 48. Aspiration per IR.   -Colorectal surgery consulted - appreciate assistance  -Clear diet adv per colorectal   -Continue IVF hydration with NS at 100 ml/hour  -Supportive care with PRN antiemetics and pain control  -Continue IV Zosyn    Diarrhea  Hx of C. Diff. Intermittent diarrhea prior to starting abx. Diarrhea for roughly 1 month taking imodium.   -C. Diff ordered with enteric isolation      History of BARBARA.   Hypoxia on admission. Non-compliant with CPAP for 2 years. COVID-19 negative.   -RT consulted to evaluate CPAP settings. Monitor respiratory status.      Electrolyte derangement (hyponatremia, hypokalemia, hypomagnesemia, and hypochloremia):   Due to poor oral intake and diarrhea. Improved.   -IVF hydration with NS at 100 ml/hour  -Replace per protocol.     -Follow-up BMP in AM     Daily alcohol abuse:   Drinks about 2 pints of vodka and 2-3 bottles of wine per week with his last drink the evening of 5/8. Reports no h/o withdrawal or seizures with stopping drinking. Etoh level negative.   -Monitor for signs of withdrawal, if concerns for withdrawal start CIWA protocol    -Daily MVI, folic acid, and thiamine replacements      Alcoholic Hepatitis:   Bilirubin and liver enzymes improving.  - Repeat LFTs in AM     Macrocytic anemia of alcoholism:   Hgb 11.5 (12.7), continue to monitor. No signs of bleeding.      Weakness  unable to get up off the floor at his home the last two days preceding admission due to profound generalized weakness. No reported falls. Noted to have mild swelling of left UE per ED provider exam (likely dependent edema from laying on this side). CT of head obtained was negative for acute process. Normal CK.   - Consider PT consult pending mobility with nursing staff      Tobacco abuse   -No cravings, declined NRT. Advised to quit.     Obesity BMI 36  -Complicates cares      FEN: 100 ml/hr; replacement protocol; full liquid diet    Activity: PT/OT  DVT Prophylaxis: Pneumatic Compression Devices  Code Status: Full Code   Family update: Not today.   Expected discharge: 2 - 3 days, recommended to transitional care unit once SIRS/Sepsis treated.    Lisa Truong DO  Text Page (7am - 6pm, M-F)    Interval History   Doing ok today. Frequent loose stools. Reports worsened this AM. Three loose stools in AM. Epigastric abdominal pain. Improved from admission. No SOB, cough, or chest pain. Discussed with nursing. No other concerns.     -Data reviewed today: I reviewed all new labs and imaging results over the last 24 hours. I personally reviewed     Physical Exam   Temp: 97.8  F (36.6  C) Temp src: Oral BP: (!) 161/80   Heart Rate: 72 Resp: 20 SpO2: 95 % O2 Device: None (Room air)    Vitals:    05/11/20 0733 05/14/20 0510   Weight: 99.8 kg (220 lb) 109.5 kg (241 lb 6.4 oz)     Vital Signs with Ranges  Temp:  [96.3  F (35.7  C)-98.2  F (36.8  C)] 97.8  F (36.6  C)  Heart Rate:  [68-85] 72  Resp:  [18-20] 20  BP: (161-178)/(80-98) 161/80  SpO2:  [93 %-95 %] 95 %  I/O last 3 completed shifts:  In: 2704 [P.O.:550; I.V.:2154]  Out: 975 [Urine:975]    Constitutional: Awake, alert, cooperative, no apparent distress. Non-toxic. Disheveled.  Appears stated age.   HEENT: Atraumatic. Normocephalic. Conjunctiva non-injected. Sclera anicteric. MMM.   Respiratory: Moves air bilaterally. Clear to auscultation bilaterally, no crackles or wheezing  Cardiovascular: Regular rate and rhythm, normal S1 and S2, and no murmur noted  GI: Obese, round, tender to palpation in epigastric region, no rebound tenderness or guarding. Normal bowel sounds  Skin/Integumen: No rashes, no cyanosis, no edema    Medications     sodium chloride 100 mL/hr at 05/14/20 1306       piperacillin-tazobactam  3.375 g Intravenous Q6H     sodium chloride (PF)  3 mL Intracatheter Q8H     Data   Recent Labs   Lab 05/14/20  0705 05/13/20  0650 05/12/20  0818 05/12/20  0710  05/11/20  0800   WBC 6.3  7.1 8.4  --   --  9.6   HGB 10.9* 10.5* 11.5*  --   --  12.7*   * 104* 104*  --   --  96   * 119* 124*  --   --  154   INR  --   --   --   --   --  0.95    136  --  136  --  131*   POTASSIUM 3.7 3.4  --  3.4   < > 3.1*   CHLORIDE 102 101  --  99  --  92*   CO2 26 30  --  32  --  32   BUN 16 18  --  21  --  22   CR 0.62* 0.70  --  0.72  --  0.79   ANIONGAP 9 5  --  5  --  7   SY 8.3* 8.3*  --  8.6  --  8.6   GLC 79 65*  --  84  --  116*   ALBUMIN  --  2.1*  --  2.3*  --  2.5*   PROTTOTAL  --  6.0*  --  6.6*  --  7.2   BILITOTAL  --  1.6*  --  1.9*  --  3.1*   ALKPHOS  --  115  --  135  --  167*   ALT  --  75*  --  85*  --  101*   AST  --  127*  --  137*  --  156*   LIPASE  --   --   --   --   --  107    < > = values in this interval not displayed.     No results found for this or any previous visit (from the past 24 hour(s)).

## 2020-05-14 NOTE — PROGRESS NOTES
End of Shift Summary  For vital signs and complete assessments, please see documentation flowsheets.     Pertinent assessments: elevated BP's, on RA, Tylenol and Oxycodone x 1, using urinal at bedside, loose stools x 2 this shift, incont.     Major Shift Events   Treatment Plan: IVF, Zosyn

## 2020-05-14 NOTE — PROGRESS NOTES
COLON & RECTAL SURGERY  PROGRESS NOTE    May 14, 2020    SUBJECTIVE:  Pt resting in bed on arrival. Had several loose stools overnight. No fevers, nausea, or chills. Abdominal pain controlled with medication. Not much appetite.     OBJECTIVE:  Temp:  [96.3  F (35.7  C)-98.2  F (36.8  C)] 97.8  F (36.6  C)  Heart Rate:  [68-85] 72  Resp:  [18-20] 20  BP: (161-178)/(80-98) 161/80  SpO2:  [93 %-95 %] 95 %    Intake/Output Summary (Last 24 hours) at 5/14/2020 0937  Last data filed at 5/14/2020 0900  Gross per 24 hour   Intake 2704 ml   Output 1075 ml   Net 1629 ml       GENERAL:  Awake, alert, no acute distress  HEAD: Normocephalic atraumatic  SCLERA: Anicteric  EXTREMITIES: Warm and well perfused  ABDOMEN:  Soft, tender to palpation upper abdomen, non-distended. No guarding, rigidity, or peritoneal signs.   RECTAL: No palpable stool in rectal vault.    LABS:  Lab Results   Component Value Date    WBC 6.3 05/14/2020     Lab Results   Component Value Date    HGB 10.9 05/14/2020     Lab Results   Component Value Date    HCT 33.8 05/14/2020     Lab Results   Component Value Date     05/14/2020     Last Basic Metabolic Panel:  Lab Results   Component Value Date     05/14/2020      Lab Results   Component Value Date    POTASSIUM 3.7 05/14/2020     Lab Results   Component Value Date    CHLORIDE 102 05/14/2020     Lab Results   Component Value Date    SY 8.3 05/14/2020     Lab Results   Component Value Date    CO2 26 05/14/2020     Lab Results   Component Value Date    BUN 16 05/14/2020     Lab Results   Component Value Date    CR 0.62 05/14/2020     Lab Results   Component Value Date    GLC 79 05/14/2020       ASSESSMENT/PLAN: 64 year old man with heavy alcohol use and smoking now with about 1 month of diarrhea for which he was taking imodium. More recently constipation and abdominal pain. CT showed a small pericolonic collection and inflammation of the sigmoid colon. This was aspirated by IR. Currently, AVSS,  return of loose stools overnight will check C diff. No nausea. Pain controlled. No urgent plans for surgery, will follow. Colonoscopy recommended once acute episode resolves.      1. Full liquid diet.   2. PRN pain medication, minimize narcotics  3. Continue IVF  4. Continue IV abx, modify with final culture results as needed  5. OOB, ambulate QID  6. Strict I&Os  7. Check for C diff  7. Plan will be discussed with Dr Pederson      For questions/paging, please contact the CRS office at [phone].    Tammi Elliott PA-C  [facility]  Phone: [phone]      Colon and Rectal Surgery Attending Note    Patient seen and examined independently.  Agree with above assessment and plan.  Still with some abdominal pain, now more epigastric.   No nausea. + loose stools, no blood  abd: soft with focal tenderness, no rebound  Plan as above.   Rule out CDif as he was having loose stools prior to admission.    Zulma Pederson MD  [facility]  Office Phone # [phone]

## 2020-05-15 ENCOUNTER — APPOINTMENT (OUTPATIENT)
Dept: GENERAL RADIOLOGY | Facility: CLINIC | Age: 65
DRG: 391 | End: 2020-05-15
Attending: INTERNAL MEDICINE
Payer: OTHER GOVERNMENT

## 2020-05-15 LAB
ALBUMIN SERPL-MCNC: 2.2 G/DL (ref 3.4–5)
ALP SERPL-CCNC: 111 U/L (ref 40–150)
ALT SERPL W P-5'-P-CCNC: 77 U/L (ref 0–70)
ANION GAP SERPL CALCULATED.3IONS-SCNC: 10 MMOL/L (ref 3–14)
AST SERPL W P-5'-P-CCNC: 119 U/L (ref 0–45)
BACTERIA SPEC CULT: ABNORMAL
BILIRUB SERPL-MCNC: 1.7 MG/DL (ref 0.2–1.3)
BUN SERPL-MCNC: 13 MG/DL (ref 7–30)
C COLI+JEJUNI+LARI FUSA STL QL NAA+PROBE: NOT DETECTED
CALCIUM SERPL-MCNC: 7.7 MG/DL (ref 8.5–10.1)
CHLORIDE SERPL-SCNC: 104 MMOL/L (ref 94–109)
CO2 SERPL-SCNC: 25 MMOL/L (ref 20–32)
CREAT SERPL-MCNC: 0.63 MG/DL (ref 0.66–1.25)
EC STX1 GENE STL QL NAA+PROBE: NOT DETECTED
EC STX2 GENE STL QL NAA+PROBE: NOT DETECTED
ENTERIC PATHOGEN COMMENT: NORMAL
ERYTHROCYTE [DISTWIDTH] IN BLOOD BY AUTOMATED COUNT: 15.3 % (ref 10–15)
GFR SERPL CREATININE-BSD FRML MDRD: >90 ML/MIN/{1.73_M2}
GLUCOSE SERPL-MCNC: 79 MG/DL (ref 70–99)
HCT VFR BLD AUTO: 35.7 % (ref 40–53)
HGB BLD-MCNC: 11.3 G/DL (ref 13.3–17.7)
MCH RBC QN AUTO: 32.6 PG (ref 26.5–33)
MCHC RBC AUTO-ENTMCNC: 31.7 G/DL (ref 31.5–36.5)
MCV RBC AUTO: 103 FL (ref 78–100)
NOROV GI+II ORF1-ORF2 JNC STL QL NAA+PR: NOT DETECTED
PHOSPHATE SERPL-MCNC: 3.5 MG/DL (ref 2.5–4.5)
PLATELET # BLD AUTO: 179 10E9/L (ref 150–450)
POTASSIUM SERPL-SCNC: 3.6 MMOL/L (ref 3.4–5.3)
POTASSIUM SERPL-SCNC: 3.7 MMOL/L (ref 3.4–5.3)
PROLACTIN SERPL-MCNC: 38 UG/L (ref 2–18)
PROT SERPL-MCNC: 6.5 G/DL (ref 6.8–8.8)
RBC # BLD AUTO: 3.47 10E12/L (ref 4.4–5.9)
RVA NSP5 STL QL NAA+PROBE: NOT DETECTED
SALMONELLA SP RPOD STL QL NAA+PROBE: NOT DETECTED
SHIGELLA SP+EIEC IPAH STL QL NAA+PROBE: NOT DETECTED
SODIUM SERPL-SCNC: 139 MMOL/L (ref 133–144)
SPECIMEN SOURCE: ABNORMAL
V CHOL+PARA RFBL+TRKH+TNAA STL QL NAA+PR: NOT DETECTED
WBC # BLD AUTO: 7.5 10E9/L (ref 4–11)
Y ENTERO RECN STL QL NAA+PROBE: NOT DETECTED

## 2020-05-15 PROCEDURE — 12000000 ZZH R&B MED SURG/OB

## 2020-05-15 PROCEDURE — 36415 COLL VENOUS BLD VENIPUNCTURE: CPT | Performed by: HOSPITALIST

## 2020-05-15 PROCEDURE — 25000132 ZZH RX MED GY IP 250 OP 250 PS 637: Performed by: INTERNAL MEDICINE

## 2020-05-15 PROCEDURE — 84100 ASSAY OF PHOSPHORUS: CPT | Performed by: INTERNAL MEDICINE

## 2020-05-15 PROCEDURE — 25000128 H RX IP 250 OP 636: Performed by: INTERNAL MEDICINE

## 2020-05-15 PROCEDURE — 99232 SBSQ HOSP IP/OBS MODERATE 35: CPT | Performed by: INTERNAL MEDICINE

## 2020-05-15 PROCEDURE — 25800030 ZZH RX IP 258 OP 636: Performed by: PHYSICIAN ASSISTANT

## 2020-05-15 PROCEDURE — 85027 COMPLETE CBC AUTOMATED: CPT | Performed by: INTERNAL MEDICINE

## 2020-05-15 PROCEDURE — 84132 ASSAY OF SERUM POTASSIUM: CPT | Performed by: HOSPITALIST

## 2020-05-15 PROCEDURE — 80053 COMPREHEN METABOLIC PANEL: CPT | Performed by: INTERNAL MEDICINE

## 2020-05-15 PROCEDURE — 71046 X-RAY EXAM CHEST 2 VIEWS: CPT

## 2020-05-15 PROCEDURE — 25000132 ZZH RX MED GY IP 250 OP 250 PS 637: Performed by: HOSPITALIST

## 2020-05-15 PROCEDURE — 36415 COLL VENOUS BLD VENIPUNCTURE: CPT | Performed by: INTERNAL MEDICINE

## 2020-05-15 PROCEDURE — 25000128 H RX IP 250 OP 636: Performed by: HOSPITALIST

## 2020-05-15 PROCEDURE — 25000132 ZZH RX MED GY IP 250 OP 250 PS 637: Performed by: PHYSICIAN ASSISTANT

## 2020-05-15 PROCEDURE — 87506 IADNA-DNA/RNA PROBE TQ 6-11: CPT | Performed by: INTERNAL MEDICINE

## 2020-05-15 RX ORDER — LOPERAMIDE HCL 2 MG
2 CAPSULE ORAL 4 TIMES DAILY PRN
Status: DISCONTINUED | OUTPATIENT
Start: 2020-05-15 | End: 2020-05-18 | Stop reason: HOSPADM

## 2020-05-15 RX ORDER — FUROSEMIDE 10 MG/ML
20 INJECTION INTRAMUSCULAR; INTRAVENOUS ONCE
Status: COMPLETED | OUTPATIENT
Start: 2020-05-15 | End: 2020-05-15

## 2020-05-15 RX ADMIN — MULTIPLE VITAMINS W/ MINERALS TAB 1 TABLET: TAB at 09:33

## 2020-05-15 RX ADMIN — SODIUM CHLORIDE: 9 INJECTION, SOLUTION INTRAVENOUS at 06:43

## 2020-05-15 RX ADMIN — OXYCODONE HYDROCHLORIDE 5 MG: 5 TABLET ORAL at 21:49

## 2020-05-15 RX ADMIN — THIAMINE HCL TAB 100 MG 100 MG: 100 TAB at 09:33

## 2020-05-15 RX ADMIN — TAZOBACTAM SODIUM AND PIPERACILLIN SODIUM 3.38 G: 375; 3 INJECTION, SOLUTION INTRAVENOUS at 09:37

## 2020-05-15 RX ADMIN — FUROSEMIDE 20 MG: 10 INJECTION, SOLUTION INTRAVENOUS at 11:44

## 2020-05-15 RX ADMIN — ACETAMINOPHEN 650 MG: 325 TABLET, FILM COATED ORAL at 17:07

## 2020-05-15 RX ADMIN — OXYCODONE HYDROCHLORIDE 5 MG: 5 TABLET ORAL at 17:07

## 2020-05-15 RX ADMIN — OXYCODONE HYDROCHLORIDE 5 MG: 5 TABLET ORAL at 09:33

## 2020-05-15 RX ADMIN — FOLIC ACID 1 MG: 1 TABLET ORAL at 09:33

## 2020-05-15 RX ADMIN — TAZOBACTAM SODIUM AND PIPERACILLIN SODIUM 3.38 G: 375; 3 INJECTION, SOLUTION INTRAVENOUS at 03:06

## 2020-05-15 RX ADMIN — CLONIDINE HYDROCHLORIDE 0.1 MG: 0.1 TABLET ORAL at 21:49

## 2020-05-15 RX ADMIN — ACETAMINOPHEN 650 MG: 325 TABLET, FILM COATED ORAL at 21:49

## 2020-05-15 RX ADMIN — TAZOBACTAM SODIUM AND PIPERACILLIN SODIUM 3.38 G: 375; 3 INJECTION, SOLUTION INTRAVENOUS at 21:51

## 2020-05-15 RX ADMIN — TAZOBACTAM SODIUM AND PIPERACILLIN SODIUM 3.38 G: 375; 3 INJECTION, SOLUTION INTRAVENOUS at 15:45

## 2020-05-15 RX ADMIN — CLONIDINE HYDROCHLORIDE 0.1 MG: 0.1 TABLET ORAL at 09:33

## 2020-05-15 RX ADMIN — CALCIUM CARBONATE (ANTACID) CHEW TAB 500 MG 1000 MG: 500 CHEW TAB at 19:59

## 2020-05-15 ASSESSMENT — ACTIVITIES OF DAILY LIVING (ADL)
ADLS_ACUITY_SCORE: 18
ADLS_ACUITY_SCORE: 11.5
ADLS_ACUITY_SCORE: 10.5
ADLS_ACUITY_SCORE: 11.5

## 2020-05-15 ASSESSMENT — MIFFLIN-ST. JEOR: SCORE: 1930.25

## 2020-05-15 NOTE — PROGRESS NOTES
"RRT Note    S: Called to see patient for AMS. He is clearly encephalopathic, cannot provide history. Awake and nonsensical speech. Was normal about 30 min prior per RN.    O:  VS: BP (!) 182/110 (BP Location: Left arm)   Pulse 103   Temp 99  F (37.2  C) (Axillary)   Resp 20   Ht 1.727 m (5' 8\")   Wt 109.5 kg (241 lb 6.4 oz)   SpO2 99%   BMI 36.70 kg/m     GEN NAD, confused  CV RRR   PULM CTAB  Neuro non cooperative    Lab:   Trop 0.02, BMP Ok except K 3.3, lactate WNL  Hb 11.5, ammonia 31, ABG 7.31/44/91/22    Head CT: Neg    A/P:  1. Acute encephalopathy: Unclear etiology but suspect seizure, likely etoh WD. Admitted 5/11, drinks daily (2 pints vodka and 2-3 bottles wine per week per H&P). Mental status now clearing. Imaging and lab w/u unrevealing. Monitor closely. No further work up. Start CIWA, ativan trigger dosing, vitamins, electrolyte replacement, clonidine for HTN.    Freddy Workman, DO  May 14, 2020    "

## 2020-05-15 NOTE — PROGRESS NOTES
End of Shift Summary  For vital signs and complete assessments, please see documentation flowsheets.     Pertinent assessments: A&O, forgetful, confused at times. Slightly elevated BP, scheduled Clonidine given, c/o left side/abdomen pain Tylenol x 1 given. CIWA scores q 4 hrs, using urinal, repositioned    Major Shift Events K replacement, recheck 3.6  Treatment Plan: IVF, CIWA, electrolyte replacement, sitter at bedside, seizure precautions

## 2020-05-15 NOTE — PROGRESS NOTES
COLON & RECTAL SURGERY  PROGRESS NOTE    May 15, 2020    SUBJECTIVE:  Pt resting in bed on arrival. RRT called overnight for AMS. Work up suggestive acute encephalopathy secondary to alcohol withdrawal. Oriented to person and place this morning. Last lose stool was 6pm yesterday. C diff negative. Denies abdominal pain, nausea, or fevers this morning. Wants to try solid food.    OBJECTIVE:  Temp:  [98.2  F (36.8  C)-99  F (37.2  C)] 98.2  F (36.8  C)  Pulse:  [69] 69  Heart Rate:  [68-96] 96  Resp:  [20-24] 24  BP: (123-182)/() 152/90  SpO2:  [92 %-99 %] 92 %    Intake/Output Summary (Last 24 hours) at 5/15/2020 0934  Last data filed at 5/15/2020 0518  Gross per 24 hour   Intake 2135 ml   Output 375 ml   Net 1760 ml       GENERAL:  Awake, alert, no acute distress,   HEAD: Normocephalic atraumatic  SCLERA: Anicteric  EXTREMITIES: Warm and well perfused  ABDOMEN:  Soft, non-tender, mildly distended. No guarding, rigidity, or peritoneal signs.     LABS:  Lab Results   Component Value Date    WBC 7.5 05/15/2020     Lab Results   Component Value Date    HGB 11.3 05/15/2020     Lab Results   Component Value Date    HCT 35.7 05/15/2020     Lab Results   Component Value Date     05/15/2020     Last Basic Metabolic Panel:  Lab Results   Component Value Date     05/15/2020      Lab Results   Component Value Date    POTASSIUM 3.7 05/15/2020     Lab Results   Component Value Date    CHLORIDE 104 05/15/2020     Lab Results   Component Value Date    SY 7.7 05/15/2020     Lab Results   Component Value Date    CO2 25 05/15/2020     Lab Results   Component Value Date    BUN 13 05/15/2020     Lab Results   Component Value Date    CR 0.63 05/15/2020     Lab Results   Component Value Date    GLC 79 05/15/2020       ASSESSMENT/PLAN: 64 year old man with history of heavy alcohol use and smoking now with about 1 month of diarrhea for which he was taking imodium. Presented to [facility] with weakness and abdominal pain. CT  showed a small pericolonic collection and inflammation of the sigmoid colon. This was aspirated by IR. Currently, AVSS, loose stools, C diff negative. Being treated for acute encephalopathy due to alcohol withdrawal. Abdominal pain resolved. Will advance diet. Colonoscopy recommended 6 weeks after acute episode resolves.      1. Advance to low fiber diet  2. PRN pain medication, minimize narcotics  3. Continue IVF  4. Continue IV abx, modify with final culture results as needed  5. OOB, ambulate QID  6. Strict I&Os  7. No plans for surgery, needs scope in 6 weeks after episode resolves. Will follow peripherally.  8. Plan be discussed with Dr Pederson    For questions/paging, please contact the CRS office at [phone].    Tammi Elliott PA-C  [facility]  Phone: [phone]    Colon and Rectal Surgery Attending Note    Patient seen and examined independently.  Agree with above assessment and plan.  Sleeping after RRC last night and CWA protocol  Abd soft  Plan as above.   Care per hospitalist.   Colonoscopy in 6 weeks.   If increased pain, fevers, increased WBC then repeat imaging.     Zulma Pederson MD  [facility]  Office Phone # [phone]

## 2020-05-15 NOTE — PROGRESS NOTES
Discharge Planner   Discharge Plans in progress: Pt's wife called from [place].. Concerned about d.c plan. Provided second number to [facility]  [phone] to see if they could be more helpful for possible d.c planning needs.   Barriers to discharge plan: SW not sure where pt may have coverage for TCU due to ins in network issues being from [facility] from [facility] care  May need to [facility] and get [facility]???  Vs TCU options.   Follow up plan: Will send referrals for Monday to facilities that have a [facility] contract hoping they can obtain auth    Wife questioning if she should fly here to assist  She did confirm that pt drinks Vodka daily     Will send to [place]. [place] [place], [facility], [facility]     Corinne White [facility]  Inpatient Care Coordination   [phone]  [facility]          Entered by: Corinne C. White 05/15/2020 4:12 PM

## 2020-05-15 NOTE — PROGRESS NOTES
[facility]  Hospitalist Progress Note    Assessment & Plan   Dago Sheikh is a 64 year old male with PMH significant for alcohol abuse, chronic knee and back pain, h/o c diff, diverticulosis, and tobacco abuse who presents with diarrhea and abdominal pain.     ED workup reveals: intermittently hypertensive up to 167/100, mildly hypoxic down to 89% on room air with improvement with 2 L of supplemental oxygen, initially tachycardic, sodium of 131, potassium of 3.1, chloride 92, magnesium of 1.3, albumin of 2.5, total bilirubin of 3.1, alkaline phosphatase of 167, ALT of 101, AST of 156, glucose of 116, hemoglobin of 12.7, UA shows small bilirubin, trace ketones, COVID-19 swab obtained and pending, EtOH level negative, normal lipase, normal total CK, EKG shows rate of 106 bpm in sinus tachycardia with left anterior fascicular block, nonspecific ST abnormality, CT of head negative for acute pathology, and CT of chest/abdomen/pelvis shows no evidence of PE, ectasia of the ascending thoracic aorta measures 4 cm in diameter, marked diffuse fatty infiltration of the liver, cholelithiasis, and findings suspicious for acute sigmoid diverticulitis with a small adjacent diverticular abscess measuring 2.5 cm.     Colorectal surgery consulted and IR completed aspiration of the abscess. Cultures grew E. Coli. Started on IV Zosyn. Increased frequent diarrheas and stool sent for C. Diff which was negative    Acute alcohol withdrawal seizure on 5/14/20 overnight. Continued CIWA monitoring. Mentation slowly improved.     Acute sigmoid diverticulitis with abscess. E. Coli, Morganella morganii, and proteus mirabilis  Left and centralized lower abdominal pain for the past month with intermittent nonbloody diarrhea, nausea, and a few episodes of vomiting this past week with increased weakness, decreased appetite and poor PO intake. Has prior history of diverticulosis on colonoscopy performed about 5 years ago but no h/o  diverticulitis. Afebrile, no leukocytosis. CT scan showed acute sigmoid diverticulitis with a small adjacent diverticular abscess measuring 2.5 cm (likely not amenable to CT guided drainage due to its small size). CRP 68, PCT 0.84, ESR 48. Aspiration per IR.   -Colorectal surgery consulted - appreciate assistance  -Clear diet adv per colorectal - encourage oral fluids  -Supportive care with PRN antiemetics and pain control  -Continue IV Zosyn - awaiting susceptibilities     Diarrhea  Hx of C. Diff. Intermittent diarrhea prior to starting abx. Diarrhea for roughly 1 month taking imodium. C. Diff negative  -Stool sample sent for PCR testing  -Imodium prn     Acute hypoxic respiratory failure  -Hypoxia overnight requiring oxygen. IVF and volume up. Lasix 20 mg once. Monitor volume and respiratory status. Possible aspiration event with seizure and mentation change. CXR ordered.      History of BARBARA.   Hypoxia on admission. Non-compliant with CPAP for 2 years. COVID-19 negative.   -RT consulted to evaluate CPAP settings. Monitor respiratory status.      Electrolyte derangement (hyponatremia, hypokalemia, hypomagnesemia, and hypochloremia):   Due to poor oral intake and diarrhea. Improved.   -Replace per protocol.     -Follow-up BMP     Daily alcohol abuse with acute alcohol withdrawal seizure   Drinks about 2 pints of vodka and 2-3 bottles of wine per week with his last drink the evening of 5/8. Reports no h/o withdrawal or seizures with stopping drinking. Etoh level negative. Seizure with acute mentation change on 5/14.   -CIWA protocol. Ativan prn   -Seizure precautions   -Clonidine 0.1 mg BID   -Daily MVI, folic acid, and thiamine replacements      Alcoholic Hepatitis:   Bilirubin and liver enzymes improving.  -LFTs improving      Macrocytic anemia of alcoholism:   Hgb 11.5 (12.7), continue to monitor. No signs of bleeding.      Weakness  unable to get up off the floor at his home the last two days preceding  admission due to profound generalized weakness. No reported falls. Noted to have mild swelling of left UE per ED provider exam (likely dependent edema from laying on this side). CT of head obtained was negative for acute process. Normal CK.   - Consider PT consult pending mobility with nursing staff      Tobacco abuse   -No cravings, declined NRT. Advised to quit.     Obesity BMI 36  -Complicates cares      FEN: oral hydration; replacement protocol; full liquid diet   Activity: PT/OT  DVT Prophylaxis: Pneumatic Compression Devices  Code Status: Full Code   Family update: Nursing updated family   Expected discharge: 2 - 3 days, recommended to transitional care unit once antibiotic plan established.    Lisa Truong, DO  Text Page (7am - 6pm, M-F)    Interval History   Doing better today. Mentation not back to baseline but improved. Oriented. Increased oxygen needs without respiratory distress. Desats with sleep. No increased work of breathing. No chest pain or palpitations. No increased sputum production. No other concerns today. Discussed with nursing.     -Data reviewed today: I reviewed all new labs and imaging results over the last 24 hours. I personally reviewed     Physical Exam   Temp: 98.2  F (36.8  C) Temp src: Oral BP: (!) 149/75 Pulse: 69 Heart Rate: 96 Resp: 24 SpO2: 95 % O2 Device: Oxymask Oxygen Delivery: 1 LPM  Vitals:    05/11/20 0733 05/14/20 0510 05/15/20 1127   Weight: 99.8 kg (220 lb) 109.5 kg (241 lb 6.4 oz) 116.1 kg (255 lb 14.4 oz)     Vital Signs with Ranges  Temp:  [98.2  F (36.8  C)-99  F (37.2  C)] 98.2  F (36.8  C)  Pulse:  [69] 69  Heart Rate:  [68-96] 96  Resp:  [20-24] 24  BP: (123-182)/() 149/75  SpO2:  [89 %-99 %] 95 %  I/O last 3 completed shifts:  In: 2403 [P.O.:720; I.V.:1683]  Out: 1125 [Urine:1125]    Constitutional: Awake, alert, cooperative, no apparent distress. Non-toxic. Disheveled.  Appears stated age.   HEENT: Atraumatic. Normocephalic. Conjunctiva non-injected.  Sclera anicteric. MMM.   Respiratory: Moves air bilaterally. No wheezing. Crackles.   Cardiovascular: Regular rate and rhythm, normal S1 and S2, and no murmur noted  GI: Obese, round, tender to palpation in epigastric region, no rebound tenderness or guarding. Normal bowel sounds  Skin/Integumen: No rashes, no cyanosis, Non-pitting edema in lower extremities.     Medications       cloNIDine  0.1 mg Oral BID     folic acid  1 mg Oral Daily     multivitamin w/minerals  1 tablet Oral Daily     piperacillin-tazobactam  3.375 g Intravenous Q6H     sodium chloride (PF)  3 mL Intracatheter Q8H     vitamin B1  100 mg Oral Daily     Data   Recent Labs   Lab 05/15/20  0709 05/15/20  0157 05/14/20  1745 05/14/20  0705 05/13/20  0650  05/11/20  0800   WBC 7.5  --  7.4 6.3 7.1   < > 9.6   HGB 11.3*  --  11.5* 10.9* 10.5*   < > 12.7*   *  --  102* 101* 104*   < > 96     --  172 143* 119*   < > 154   INR  --   --   --   --   --   --  0.95     --  136 137 136   < > 131*   POTASSIUM 3.7 3.6 3.3* 3.7 3.4   < > 3.1*   CHLORIDE 104  --  101 102 101   < > 92*   CO2 25  --  24 26 30   < > 32   BUN 13  --  14 16 18   < > 22   CR 0.63*  --  0.66 0.62* 0.70   < > 0.79   ANIONGAP 10  --  11 9 5   < > 7   SY 7.7*  --  8.1* 8.3* 8.3*   < > 8.6   GLC 79  --  104* 79 65*   < > 116*   ALBUMIN 2.2*  --   --   --  2.1*   < > 2.5*   PROTTOTAL 6.5*  --   --   --  6.0*   < > 7.2   BILITOTAL 1.7*  --   --   --  1.6*   < > 3.1*   ALKPHOS 111  --   --   --  115   < > 167*   ALT 77*  --   --   --  75*   < > 101*   *  --   --   --  127*   < > 156*   LIPASE  --   --   --   --   --   --  107   TROPI  --   --  0.020  --   --   --   --     < > = values in this interval not displayed.     Recent Results (from the past 24 hour(s))   CT Head w/o Contrast    Narrative    EXAM: CT HEAD W/O CONTRAST  LOCATION: [facility]  DATE/TIME: 5/14/2020 5:59 PM    INDICATION: Altered level of consciousness (LOC),  unexplained.  COMPARISON: None.  TECHNIQUE: Routine without IV contrast. Multiplanar reformats. Dose reduction techniques were used.    FINDINGS:  INTRACRANIAL CONTENTS: No intracranial hemorrhage, extraaxial collection, or mass effect.  No CT evidence of acute infarct. Mild presumed chronic small vessel ischemic changes. Mild generalized volume loss. No hydrocephalus.     VISUALIZED ORBITS/SINUSES/MASTOIDS: No intraorbital abnormality. No paranasal sinus mucosal disease. No middle ear or mastoid effusion.    BONES/SOFT TISSUES: No acute abnormality.      Impression    IMPRESSION:  1.  No CT evidence for acute intracranial process.  2.  Brain atrophy and presumed chronic microvascular ischemic changes as above.   XR Chest 2 Views    Narrative    CHEST TWO VIEWS May 15, 2020 12:38 PM     HISTORY: Shortness of breath.     COMPARISON: None.       Impression    IMPRESSION: Question minimal pleural fluid and/or infiltrate in the  costophrenic sulcus on the right. Remaining lungs clear. The cardiac  silhouette is not enlarged. Pulmonary vasculature is unremarkable.     ADAM OWEN MD

## 2020-05-15 NOTE — PROVIDER NOTIFICATION
"Notified MD - \"Pt lung sounds crackles in the bases, wheezy with activity. Stop IVF?\" at 0948.     Edit: 1002 - Per MD, hold fluids until MD assesses pt.   "

## 2020-05-15 NOTE — PROGRESS NOTES
CLINICAL NUTRITION SERVICES - REASSESSMENT NOTE      Malnutrition: (5/15/2020)  % Weight Loss: unable to determine --> limited weight history and 2 vastly different wts since admit making hard to determine  % Intake: </= 50% for >/= 5 days (severe malnutrition)  Subcutaneous Fat Loss: unable to determine, deferred  Muscle Loss: unable to determine, deferred  Fluid Retention:  None noted     Malnutrition Diagnosis: Unable to determine due to lack of weight history, nutrition hx and nutrition focused physical exam per direction of new department policy in the setting of COVID19       EVALUATION OF PROGRESS TOWARD GOALS   Diet: Fulls    Intake/Tolerance:  Previously confused and previous sitter in the setting of withdrawal.  Multiple attempts to call into room today without success, therefore could not obtain nutrition history.  Diet advanced to clears 5/13/2020 and fulls since 5/14/2020.  No meals ordered since admission.      Barriers to PO intakes including: Withdrawal, abdominal pain, decreased appetite.    - CRS now following, s/p aspiration of pericolonic abscess.  - RRT last night with AMS, suspect seizure in the setting of etoh withdrawal.      ASSESSED NUTRITION NEEDS PER APPROVED PRACTICE GUIDELINES:     Dosing Weight 99.8 kg   Estimated Energy Needs: 0986-3753 kcals (20-25 Kcal/Kg)  Justification: maintenance and obese  Estimated Protein Needs: 100-120 grams protein (1-1.2 g pro/Kg)  Justification: maintenance  Estimated Fluid Needs: per MD       NEW FINDINGS:   - Medications reviewed including:     cloNIDine  0.1 mg Oral BID     folic acid  1 mg Oral Daily     multivitamin w/minerals  1 tablet Oral Daily     piperacillin-tazobactam  3.375 g Intravenous Q6H     sodium chloride (PF)  3 mL Intracatheter Q8H     vitamin B1  100 mg Oral Daily        sodium chloride 100 mL/hr at 05/15/20 0737      - Labs reviewed including:  Electrolytes  Potassium (mmol/L)   Date Value   05/15/2020 3.7   05/15/2020 3.6    05/14/2020 3.3 (L)    Blood Glucose  Glucose (mg/dL)   Date Value   05/15/2020 79   05/14/2020 104 (H)   05/14/2020 79   05/13/2020 65 (L)   05/12/2020 84    Inflammatory Markers  CRP Inflammation (mg/L)   Date Value   05/13/2020 40.9 (H)   05/11/2020 68.1 (H)     WBC (10e9/L)   Date Value   05/15/2020 7.5   05/14/2020 7.4   05/14/2020 6.3     Albumin (g/dL)   Date Value   05/15/2020 2.2 (L)   05/13/2020 2.1 (L)   05/12/2020 2.3 (L)      Magnesium (mg/dL)   Date Value   05/11/2020 1.6   05/11/2020 1.3 (L)     Sodium (mmol/L)   Date Value   05/15/2020 139   05/14/2020 136   05/14/2020 137    Renal  Urea Nitrogen (mg/dL)   Date Value   05/15/2020 13   05/14/2020 14   05/14/2020 16     Creatinine (mg/dL)   Date Value   05/15/2020 0.63 (L)   05/14/2020 0.66   05/14/2020 0.62 (L)     Additional  Ketones Urine (mg/dL)   Date Value   05/11/2020 Trace (A)        -  Weight trending reviewed.  2 vastly different wts (admit wt reported?).  Will need to continue trending:  Vitals:    05/11/20 0733 05/14/20 0510   Weight: 99.8 kg (220 lb) 109.5 kg (241 lb 6.4 oz)     - Stooling patterns reviewed.      Previous Goals:   Diet to advance >/=fulls in the next 48-72 hours  Evaluation: Not met    Previous Nutrition Diagnosis:    Inadequate oral intake related to nausea/vomiting, decreased appetite and diarrhea 2/2 acute sigmoid diverticulitis as evidenced by suspected poor PO intake for the past ~month.   Evaluation: No change      CURRENT NUTRITION DIAGNOSIS  Inadequate oral intake related to abdominal pain 2/2 diverticulitis with abscess, now withdrawing as evidenced by no meals ordered and meeting <50% needs since admit, suspect <75% needs over period of at least 1 week PTA.    INTERVENTIONS  Recommendations / Nutrition Prescription  Diet per CRS.    Add Boost BID between meals.      Check phosphorus given h/o etoh abuse (signed per staff rec).  Continue daily MVI/M.     Implementation  Medical Food Supplement: As  above.    Collaboration and Referral of Nutrition care: Discussed POC with team during rounds.      Goals  Patient to consume at least 50% of meals or supplements TID to show improvement in PO intakes.      MONITORING AND EVALUATION:  Progress towards goals will be monitored and evaluated per protocol and Practice Guidelines      Marce Lewis RDN, LD  Clinical Dietitian  3rd floor/ICU: [phone]  All other floors: [phone]  Weekend/holiday: [phone]

## 2020-05-15 NOTE — PLAN OF CARE
PT: attempted session this PM, pt sleeping and not wanting to participate. Will continue to follow.

## 2020-05-15 NOTE — PLAN OF CARE
Presentation/Diagnosis: Diverticulitis, abd abscess  History: Smoker, alcohol use, sleep apnea (refuses CPAP use)  Labs/Protocols: ALT/AST elevated, Hgb 11.3, K 3.6, Enteric panel pending, CXR showed ?pleural fluid to right side  Vitals: BP elevated - scheduled clonidine.   Cardiac: BLE edema. IV lasix x 1.   Telemetry: N/A  Respiratory: R side crackles, wheezy throughout. IVF stopped d/t FVO. Lasix x 1. Remains on 2L. Baseline RA.   Neuro: Seizure precautions. Previous AMS, A/O x4 this shift. Forgetful at times. CIWAs q4h without the need for ativan.   GI/: Loose stools. Incontinent of stools. Urinal use at bedside.   Skin: Ecchymotic. Bandage to abdomen from puncture with abscess drain.   LDAs: PIV x2. SL.   Diet: Low fiber.   Activity: 1-2a gb/w. PT/OT following. Alarms intact.   Plan: Plan to discharge home 2 days? Colorectal following, continue IV Zosyn for diverticulitis until culture sensitivities return to transition to PO.

## 2020-05-16 LAB
ANION GAP SERPL CALCULATED.3IONS-SCNC: 5 MMOL/L (ref 3–14)
BUN SERPL-MCNC: 15 MG/DL (ref 7–30)
CALCIUM SERPL-MCNC: 8.3 MG/DL (ref 8.5–10.1)
CHLORIDE SERPL-SCNC: 102 MMOL/L (ref 94–109)
CO2 SERPL-SCNC: 30 MMOL/L (ref 20–32)
CREAT SERPL-MCNC: 0.72 MG/DL (ref 0.66–1.25)
ERYTHROCYTE [DISTWIDTH] IN BLOOD BY AUTOMATED COUNT: 15.4 % (ref 10–15)
GFR SERPL CREATININE-BSD FRML MDRD: >90 ML/MIN/{1.73_M2}
GLUCOSE SERPL-MCNC: 101 MG/DL (ref 70–99)
HCT VFR BLD AUTO: 34.2 % (ref 40–53)
HGB BLD-MCNC: 11.2 G/DL (ref 13.3–17.7)
MAGNESIUM SERPL-MCNC: 1 MG/DL (ref 1.6–2.3)
MAGNESIUM SERPL-MCNC: 1.9 MG/DL (ref 1.6–2.3)
MCH RBC QN AUTO: 33.2 PG (ref 26.5–33)
MCHC RBC AUTO-ENTMCNC: 32.7 G/DL (ref 31.5–36.5)
MCV RBC AUTO: 102 FL (ref 78–100)
PLATELET # BLD AUTO: 169 10E9/L (ref 150–450)
POTASSIUM SERPL-SCNC: 3.1 MMOL/L (ref 3.4–5.3)
POTASSIUM SERPL-SCNC: 3.8 MMOL/L (ref 3.4–5.3)
RBC # BLD AUTO: 3.37 10E12/L (ref 4.4–5.9)
SODIUM SERPL-SCNC: 137 MMOL/L (ref 133–144)
WBC # BLD AUTO: 6.5 10E9/L (ref 4–11)

## 2020-05-16 PROCEDURE — 84132 ASSAY OF SERUM POTASSIUM: CPT | Performed by: INTERNAL MEDICINE

## 2020-05-16 PROCEDURE — 25000132 ZZH RX MED GY IP 250 OP 250 PS 637: Performed by: PHYSICIAN ASSISTANT

## 2020-05-16 PROCEDURE — 25000132 ZZH RX MED GY IP 250 OP 250 PS 637: Performed by: HOSPITALIST

## 2020-05-16 PROCEDURE — 25000128 H RX IP 250 OP 636: Performed by: HOSPITALIST

## 2020-05-16 PROCEDURE — 25000132 ZZH RX MED GY IP 250 OP 250 PS 637: Performed by: INTERNAL MEDICINE

## 2020-05-16 PROCEDURE — 80048 BASIC METABOLIC PNL TOTAL CA: CPT | Performed by: INTERNAL MEDICINE

## 2020-05-16 PROCEDURE — 36415 COLL VENOUS BLD VENIPUNCTURE: CPT | Performed by: INTERNAL MEDICINE

## 2020-05-16 PROCEDURE — 85027 COMPLETE CBC AUTOMATED: CPT | Performed by: INTERNAL MEDICINE

## 2020-05-16 PROCEDURE — 83735 ASSAY OF MAGNESIUM: CPT | Performed by: INTERNAL MEDICINE

## 2020-05-16 PROCEDURE — 12000000 ZZH R&B MED SURG/OB

## 2020-05-16 PROCEDURE — 99233 SBSQ HOSP IP/OBS HIGH 50: CPT | Performed by: INTERNAL MEDICINE

## 2020-05-16 RX ORDER — CIPROFLOXACIN 500 MG/1
500 TABLET, FILM COATED ORAL EVERY 12 HOURS SCHEDULED
Status: DISCONTINUED | OUTPATIENT
Start: 2020-05-16 | End: 2020-05-18 | Stop reason: HOSPADM

## 2020-05-16 RX ADMIN — OXYCODONE HYDROCHLORIDE 5 MG: 5 TABLET ORAL at 03:21

## 2020-05-16 RX ADMIN — CIPROFLOXACIN HYDROCHLORIDE 500 MG: 500 TABLET, FILM COATED ORAL at 20:02

## 2020-05-16 RX ADMIN — MAGNESIUM SULFATE IN WATER 4 G: 40 INJECTION, SOLUTION INTRAVENOUS at 11:59

## 2020-05-16 RX ADMIN — ACETAMINOPHEN 650 MG: 325 TABLET, FILM COATED ORAL at 16:04

## 2020-05-16 RX ADMIN — OXYCODONE HYDROCHLORIDE 5 MG: 5 TABLET ORAL at 16:04

## 2020-05-16 RX ADMIN — MULTIPLE VITAMINS W/ MINERALS TAB 1 TABLET: TAB at 08:07

## 2020-05-16 RX ADMIN — LOPERAMIDE HYDROCHLORIDE 2 MG: 2 CAPSULE ORAL at 16:04

## 2020-05-16 RX ADMIN — ACETAMINOPHEN 650 MG: 325 TABLET, FILM COATED ORAL at 20:05

## 2020-05-16 RX ADMIN — ACETAMINOPHEN 650 MG: 325 TABLET, FILM COATED ORAL at 03:21

## 2020-05-16 RX ADMIN — POTASSIUM CHLORIDE 20 MEQ: 1500 TABLET, EXTENDED RELEASE ORAL at 13:03

## 2020-05-16 RX ADMIN — CLONIDINE HYDROCHLORIDE 0.1 MG: 0.1 TABLET ORAL at 08:07

## 2020-05-16 RX ADMIN — POTASSIUM CHLORIDE 40 MEQ: 1500 TABLET, EXTENDED RELEASE ORAL at 11:03

## 2020-05-16 RX ADMIN — TAZOBACTAM SODIUM AND PIPERACILLIN SODIUM 3.38 G: 375; 3 INJECTION, SOLUTION INTRAVENOUS at 04:49

## 2020-05-16 RX ADMIN — FOLIC ACID 1 MG: 1 TABLET ORAL at 08:07

## 2020-05-16 RX ADMIN — OXYCODONE HYDROCHLORIDE 5 MG: 5 TABLET ORAL at 20:05

## 2020-05-16 RX ADMIN — CLONIDINE HYDROCHLORIDE 0.1 MG: 0.1 TABLET ORAL at 21:40

## 2020-05-16 RX ADMIN — CIPROFLOXACIN HYDROCHLORIDE 500 MG: 500 TABLET, FILM COATED ORAL at 10:09

## 2020-05-16 RX ADMIN — LOPERAMIDE HYDROCHLORIDE 2 MG: 2 CAPSULE ORAL at 22:40

## 2020-05-16 RX ADMIN — THIAMINE HCL TAB 100 MG 100 MG: 100 TAB at 08:07

## 2020-05-16 ASSESSMENT — ACTIVITIES OF DAILY LIVING (ADL)
ADLS_ACUITY_SCORE: 18

## 2020-05-16 NOTE — PROGRESS NOTES
[facility]  Hospitalist Progress Note    Assessment & Plan   Dago Sheikh is a 64 year old male with PMH significant for alcohol abuse, chronic knee and back pain, h/o c diff, diverticulosis, and tobacco abuse who presents with diarrhea and abdominal pain.     ED workup reveals: intermittently hypertensive up to 167/100, mildly hypoxic down to 89% on room air with improvement with 2 L of supplemental oxygen, initially tachycardic, sodium of 131, potassium of 3.1, chloride 92, magnesium of 1.3, albumin of 2.5, total bilirubin of 3.1, alkaline phosphatase of 167, ALT of 101, AST of 156, glucose of 116, hemoglobin of 12.7, UA shows small bilirubin, trace ketones, COVID-19 swab obtained and pending, EtOH level negative, normal lipase, normal total CK, EKG shows rate of 106 bpm in sinus tachycardia with left anterior fascicular block, nonspecific ST abnormality, CT of head negative for acute pathology, and CT of chest/abdomen/pelvis shows no evidence of PE, ectasia of the ascending thoracic aorta measures 4 cm in diameter, marked diffuse fatty infiltration of the liver, cholelithiasis, and findings suspicious for acute sigmoid diverticulitis with a small adjacent diverticular abscess measuring 2.5 cm.     Colorectal surgery consulted and IR completed aspiration of the abscess. Cultures grew E. Coli. Started on IV Zosyn. Increased frequent diarrheas and stool sent for C. Diff which was negative    Acute alcohol withdrawal seizure on 5/14/20 overnight. Continued CIWA monitoring. Mentation slowly improved.     Acute sigmoid diverticulitis with abscess. E. Coli, Morganella morganii, and proteus mirabilis  Left and centralized lower abdominal pain for the past month with intermittent nonbloody diarrhea, nausea, and a few episodes of vomiting this past week with increased weakness, decreased appetite and poor PO intake. Has prior history of diverticulosis on colonoscopy performed about 5 years ago but no h/o  diverticulitis. Afebrile, no leukocytosis. CT scan showed acute sigmoid diverticulitis with a small adjacent diverticular abscess measuring 2.5 cm (likely not amenable to CT guided drainage due to its small size). CRP 68, PCT 0.84, ESR 48. Aspiration per IR.   -Colorectal surgery consulted - appreciate assistance  -Low fiber diet per colorectal - encourage oral fluids  -Supportive care with PRN antiemetics, and pain control (still taking oxycodone 3x daily prn)  -Zosyn (5/11 - 5/16); Ciprofloxacin (5/16 - present); culture susceptibilities reviewed    Diarrhea  Hx of C. Diff. Intermittent diarrhea prior to starting abx. Diarrhea for roughly 1 month taking imodium. C. Diff negative. Stool PCR negative. Decreased frequency of stools.   -Imodium prn     Acute hypoxic respiratory failure  -Hypoxia overnight requiring oxygen. IVF and volume up. Lasix 20 mg once. Adequate diuresis and titrated off oxygen.  Chest x-ray showed minimal pleural fluid in her infiltrates.  The rest of exam was negative.    History of BARBARA.   Hypoxia on admission. Non-compliant with CPAP for 2 years. COVID-19 negative.   -RT consulted to evaluate CPAP settings. Monitor respiratory status.      Electrolyte derangement (hyponatremia, hypokalemia, hypomagnesemia, and hypochloremia):   Due to poor oral intake and diarrhea. Improved.   -Replace per protocol.        Daily alcohol abuse with acute alcohol withdrawal seizure   Drinks about 2 pints of vodka and 2-3 bottles of wine per week with his last drink the evening of 5/8. Reports no h/o withdrawal or seizures with stopping drinking. Etoh level negative. Seizure with acute mentation change on 5/14.   -CIWA protocol. Ativan prn   -Seizure precautions   -Clonidine 0.1 mg BID   -Daily MVI, folic acid, and thiamine replacements      Alcoholic Hepatitis:   Bilirubin and liver enzymes improving.  -LFTs improving      Macrocytic anemia of alcoholism:   Hgb 11.5 (12.7), continue to monitor. No signs of  bleeding.      Weakness  unable to get up off the floor at his home the last two days preceding admission due to profound generalized weakness. No reported falls. Noted to have mild swelling of left UE per ED provider exam (likely dependent edema from laying on this side). CT of head obtained was negative for acute process. Normal CK.   -Therapy working with patient recommending TCU.  Patient has declined therapy at times.  Encourage nursing development chair with meals and ambulate.     Tobacco abuse   -No cravings, declined NRT. Advised to quit.     Obesity BMI 36  -Complicates cares      FEN: oral hydration; replacement protocol; low fiber  Activity: PT/OT-TCU  DVT Prophylaxis: Pneumatic Compression Devices  Code Status: Full Code   Family update: Discussed care plan with wife  Expected discharge: Tomorrow, recommended to transitional care unit once safe disposition plan/ TCU bed available.  Difficult placement due to insurance coverage.    Lisa Truong, DO  Text Page (7am - 6pm, M-F)    Interval History   Denies abdominal pain.  Had only 2 bowel movements yesterday and 1 today.  Nursing reports that they are soft but formed.  Respiratory status improved and no longer needing oxygen.  No chest pain or palpitations.  Mentation is intact.  Has not triggered CIWA.  Discussed care plan with nursing.    -Data reviewed today: I reviewed all new labs and imaging results over the last 24 hours. I personally reviewed     Physical Exam   Temp: 96.4  F (35.8  C) Temp src: Oral BP: (!) 142/80 Pulse: 90 Heart Rate: 80 Resp: 16 SpO2: 94 % O2 Device: None (Room air) Oxygen Delivery: 1 LPM  Vitals:    05/11/20 0733 05/14/20 0510 05/15/20 1127   Weight: 99.8 kg (220 lb) 109.5 kg (241 lb 6.4 oz) 116.1 kg (255 lb 14.4 oz)     Vital Signs with Ranges  Temp:  [96.4  F (35.8  C)-99.2  F (37.3  C)] 96.4  F (35.8  C)  Pulse:  [90] 90  Heart Rate:  [80-98] 80  Resp:  [16-24] 16  BP: (112-152)/(64-90) 142/80  SpO2:  [89 %-96 %] 94 %  I/O  last 3 completed shifts:  In: 1116 [P.O.:720; I.V.:396]  Out: 1100 [Urine:1100]    Constitutional: Awake, alert, cooperative, no apparent distress. Non-toxic. Disheveled.  Appears stated age.   HEENT: Atraumatic. Normocephalic. Conjunctiva non-injected. Sclera anicteric. MMM.   Respiratory: Moves air bilaterally. No wheezing. Crackles. No increased work of breathing. No oxygen.   Cardiovascular: Regular rate and rhythm, normal S1 and S2, and no murmur noted  GI: Obese, round, non-tender, no rebound tenderness or guarding. Normal bowel sounds  Skin/Integumen: No rashes, no cyanosis, Non-pitting edema in lower extremities.     Medications       ciprofloxacin  500 mg Oral Q12H HERMELINDA     cloNIDine  0.1 mg Oral BID     folic acid  1 mg Oral Daily     multivitamin w/minerals  1 tablet Oral Daily     sodium chloride (PF)  3 mL Intracatheter Q8H     vitamin B1  100 mg Oral Daily     Data   Recent Labs   Lab 05/16/20  0616 05/15/20  0709 05/15/20  0157 05/14/20  1745  05/13/20  0650  05/11/20  0800   WBC 6.5 7.5  --  7.4   < > 7.1   < > 9.6   HGB 11.2* 11.3*  --  11.5*   < > 10.5*   < > 12.7*   * 103*  --  102*   < > 104*   < > 96    179  --  172   < > 119*   < > 154   INR  --   --   --   --   --   --   --  0.95    139  --  136   < > 136   < > 131*   POTASSIUM 3.1* 3.7 3.6 3.3*   < > 3.4   < > 3.1*   CHLORIDE 102 104  --  101   < > 101   < > 92*   CO2 30 25  --  24   < > 30   < > 32   BUN 15 13  --  14   < > 18   < > 22   CR 0.72 0.63*  --  0.66   < > 0.70   < > 0.79   ANIONGAP 5 10  --  11   < > 5   < > 7   SY 8.3* 7.7*  --  8.1*   < > 8.3*   < > 8.6   * 79  --  104*   < > 65*   < > 116*   ALBUMIN  --  2.2*  --   --   --  2.1*   < > 2.5*   PROTTOTAL  --  6.5*  --   --   --  6.0*   < > 7.2   BILITOTAL  --  1.7*  --   --   --  1.6*   < > 3.1*   ALKPHOS  --  111  --   --   --  115   < > 167*   ALT  --  77*  --   --   --  75*   < > 101*   AST  --  119*  --   --   --  127*   < > 156*   LIPASE  --   --    --   --   --   --   --  107   TROPI  --   --   --  0.020  --   --   --   --     < > = values in this interval not displayed.     Recent Results (from the past 24 hour(s))   XR Chest 2 Views    Narrative    CHEST TWO VIEWS May 15, 2020 12:38 PM     HISTORY: Shortness of breath.     COMPARISON: None.       Impression    IMPRESSION: Question minimal pleural fluid and/or infiltrate in the  costophrenic sulcus on the right. Remaining lungs clear. The cardiac  silhouette is not enlarged. Pulmonary vasculature is unremarkable.     ADAM OWEN MD

## 2020-05-16 NOTE — PLAN OF CARE
End of Shift Summary  For vital signs and complete assessments, please see documentation flowsheets.     Pertinent assessments: LS exp wheezes. Reports pain, tylenol & oxy given. CIWA negative. Urine kit.     Major Shift Events: Uneventful.    Treatment Plan: Antibiotic - Zosyn.    Discharge Readiness: Medically active  Expected Discharge Date: TBD  Discharge Disposition: TBD  Barriers for discharge: Current abx treatment.

## 2020-05-16 NOTE — PLAN OF CARE
End of Shift Summary  For vital signs and complete assessments, please see documentation flowsheets.     Pertinent assessments: Up assist of 2, walker/gait-belt. A&O x4, forgetful at times. Temp: 99.2, all other vitals stable. Able to wean off o2, sats remain <92. RLL/LLL crackles. Tolerating low fiber diet. CIWA: 0 & 1. Tylenol & oxy given x2 for abdominal pain. Elevated LFT's. Enteric panel/c-diff negative. No BM this evening.   Major Shift Events: Weaned off O2, enteric panel negative- precautions discontinued.   Treatment Plan: Zosyn, symptom management    Discharge Readiness: Medically active  Expected Discharge Date: TBD  Discharge Disposition: Transitional Care Unit  Barriers/Criteria for discharge: unknown

## 2020-05-16 NOTE — PLAN OF CARE
Appetite good. No c/o pain or nausea. Potassium and magnesium replaced to day, recheck at 1600. Zosyn stopped and po cipro started. Patient declined getting to chair for lunch.

## 2020-05-17 ENCOUNTER — APPOINTMENT (OUTPATIENT)
Dept: PHYSICAL THERAPY | Facility: CLINIC | Age: 65
DRG: 391 | End: 2020-05-17
Payer: OTHER GOVERNMENT

## 2020-05-17 LAB
ANION GAP SERPL CALCULATED.3IONS-SCNC: 6 MMOL/L (ref 3–14)
BUN SERPL-MCNC: 14 MG/DL (ref 7–30)
CALCIUM SERPL-MCNC: 8.3 MG/DL (ref 8.5–10.1)
CHLORIDE SERPL-SCNC: 102 MMOL/L (ref 94–109)
CO2 SERPL-SCNC: 28 MMOL/L (ref 20–32)
CREAT SERPL-MCNC: 0.59 MG/DL (ref 0.66–1.25)
GFR SERPL CREATININE-BSD FRML MDRD: >90 ML/MIN/{1.73_M2}
GLUCOSE SERPL-MCNC: 92 MG/DL (ref 70–99)
POTASSIUM SERPL-SCNC: 3.6 MMOL/L (ref 3.4–5.3)
SODIUM SERPL-SCNC: 136 MMOL/L (ref 133–144)

## 2020-05-17 PROCEDURE — 25000132 ZZH RX MED GY IP 250 OP 250 PS 637: Performed by: PHYSICIAN ASSISTANT

## 2020-05-17 PROCEDURE — 97110 THERAPEUTIC EXERCISES: CPT | Mod: GP | Performed by: PHYSICAL THERAPIST

## 2020-05-17 PROCEDURE — 99232 SBSQ HOSP IP/OBS MODERATE 35: CPT | Performed by: INTERNAL MEDICINE

## 2020-05-17 PROCEDURE — 25000132 ZZH RX MED GY IP 250 OP 250 PS 637: Performed by: INTERNAL MEDICINE

## 2020-05-17 PROCEDURE — 12000000 ZZH R&B MED SURG/OB

## 2020-05-17 PROCEDURE — 80048 BASIC METABOLIC PNL TOTAL CA: CPT | Performed by: INTERNAL MEDICINE

## 2020-05-17 PROCEDURE — 25000132 ZZH RX MED GY IP 250 OP 250 PS 637: Performed by: HOSPITALIST

## 2020-05-17 PROCEDURE — 36415 COLL VENOUS BLD VENIPUNCTURE: CPT | Performed by: INTERNAL MEDICINE

## 2020-05-17 RX ORDER — ALBUTEROL SULFATE 0.83 MG/ML
2.5 SOLUTION RESPIRATORY (INHALATION)
Status: DISCONTINUED | OUTPATIENT
Start: 2020-05-17 | End: 2020-05-18 | Stop reason: HOSPADM

## 2020-05-17 RX ORDER — IPRATROPIUM BROMIDE AND ALBUTEROL SULFATE 2.5; .5 MG/3ML; MG/3ML
3 SOLUTION RESPIRATORY (INHALATION) EVERY 4 HOURS PRN
Status: DISCONTINUED | OUTPATIENT
Start: 2020-05-17 | End: 2020-05-18 | Stop reason: HOSPADM

## 2020-05-17 RX ADMIN — OXYCODONE HYDROCHLORIDE 5 MG: 5 TABLET ORAL at 14:58

## 2020-05-17 RX ADMIN — FOLIC ACID 1 MG: 1 TABLET ORAL at 08:55

## 2020-05-17 RX ADMIN — ACETAMINOPHEN 650 MG: 325 TABLET, FILM COATED ORAL at 14:58

## 2020-05-17 RX ADMIN — CIPROFLOXACIN HYDROCHLORIDE 500 MG: 500 TABLET, FILM COATED ORAL at 08:55

## 2020-05-17 RX ADMIN — THIAMINE HCL TAB 100 MG 100 MG: 100 TAB at 08:55

## 2020-05-17 RX ADMIN — OXYCODONE HYDROCHLORIDE 5 MG: 5 TABLET ORAL at 20:25

## 2020-05-17 RX ADMIN — MULTIPLE VITAMINS W/ MINERALS TAB 1 TABLET: TAB at 08:55

## 2020-05-17 RX ADMIN — CIPROFLOXACIN HYDROCHLORIDE 500 MG: 500 TABLET, FILM COATED ORAL at 20:25

## 2020-05-17 RX ADMIN — LOPERAMIDE HYDROCHLORIDE 2 MG: 2 CAPSULE ORAL at 08:55

## 2020-05-17 ASSESSMENT — ACTIVITIES OF DAILY LIVING (ADL)
ADLS_ACUITY_SCORE: 18

## 2020-05-17 NOTE — PROGRESS NOTES
[facility]  Hospitalist Progress Note    Assessment & Plan   Dago Sheikh is a 64 year old male with PMH significant for alcohol abuse, chronic knee and back pain, h/o c diff, diverticulosis, and tobacco abuse who presents with diarrhea and abdominal pain.     ED workup reveals: intermittently hypertensive up to 167/100, mildly hypoxic down to 89% on room air with improvement with 2 L of supplemental oxygen, initially tachycardic, sodium of 131, potassium of 3.1, chloride 92, magnesium of 1.3, albumin of 2.5, total bilirubin of 3.1, alkaline phosphatase of 167, ALT of 101, AST of 156, glucose of 116, hemoglobin of 12.7, UA shows small bilirubin, trace ketones, COVID-19 swab obtained and pending, EtOH level negative, normal lipase, normal total CK, EKG shows rate of 106 bpm in sinus tachycardia with left anterior fascicular block, nonspecific ST abnormality, CT of head negative for acute pathology, and CT of chest/abdomen/pelvis shows no evidence of PE, ectasia of the ascending thoracic aorta measures 4 cm in diameter, marked diffuse fatty infiltration of the liver, cholelithiasis, and findings suspicious for acute sigmoid diverticulitis with a small adjacent diverticular abscess measuring 2.5 cm.     Colorectal surgery consulted and IR completed aspiration of the abscess. Cultures grew E. Coli. Morganella morganii, and proteus mirabilis. Started on IV Zosyn and transitioned to oral ciprofloxacin following culture susceptibilities. Patient prescribed at total of 14 days of abx therapy.     Increased frequent diarrheas and stool sent for C. Diff and enteric stool testing negative for acute infectious pathology. Imodium helping to decrease frequency of stools.     Acute alcohol withdrawal seizure on 5/14/20 overnight. Continued CIWA monitoring. Mentation slowly improved.     Patient had increased weakness associated with his hospitalization.  PT and OT continue to work with patient.  Nursing assisted  with ambulation and encouraging him to be up in the chair for meals.  Social work coordinating discharge to a transitional care unit.    Acute sigmoid diverticulitis with abscess. E. Coli, Morganella morganii, and proteus mirabilis  Left and centralized lower abdominal pain for the past month with intermittent nonbloody diarrhea, nausea, and a few episodes of vomiting this past week with increased weakness, decreased appetite and poor PO intake. Has prior history of diverticulosis on colonoscopy performed about 5 years ago but no h/o diverticulitis. Afebrile, no leukocytosis. CT scan showed acute sigmoid diverticulitis with a small adjacent diverticular abscess measuring 2.5 cm (likely not amenable to CT guided drainage due to its small size). CRP 68, PCT 0.84, ESR 48. Aspiration per IR.   -Colorectal surgery consulted - appreciate assistance  -Low fiber diet per colorectal - encourage oral fluids  -Supportive care with PRN antiemetics, and pain control (requiring oxycodone)  -Zosyn (5/11 - 5/16); Ciprofloxacin (5/16 - 5/25); culture susceptibilities reviewed    Diarrhea  Hx of C. Diff. Intermittent diarrhea prior to starting abx. Diarrhea for roughly 1 month taking imodium. C. Diff negative. Stool PCR negative. Decreased frequency of stools.   -Imodium prn     Acute hypoxic respiratory failure  Episode of hypoxia associated with volume overload.  Patient given Lasix 20 mg IV once.  Had adequate diuresis was titrated off of oxygen.  IV fluids were discontinued and oral hydration was encouraged    History of BARBARA.   Hypoxia on admission. Non-compliant with CPAP for 2 years. COVID-19 negative.   -RT consulted to evaluate CPAP settings. Monitor respiratory status.      Electrolyte derangement (hyponatremia, hypokalemia, hypomagnesemia, and hypochloremia):   Due to poor oral intake and diarrhea. Improved.   -Replace per protocol.        Daily alcohol abuse with acute alcohol withdrawal seizure   Drinks about 2 pints of  vodka and 2-3 bottles of wine per week with his last drink the evening of 5/8. Reports no h/o withdrawal or seizures with stopping drinking. Etoh level negative. Seizure with acute mentation change on 5/14.   -CIWA protocol. Ativan prn   -Seizure precautions   -Clonidine 0.1 mg BID   -Daily MVI, folic acid, and thiamine replacements      Alcoholic Hepatitis:   Bilirubin and liver enzymes improving.  -LFTs improving      Macrocytic anemia of alcoholism:   Hgb 11.5 (12.7), continue to monitor. No signs of bleeding.      Weakness  unable to get up off the floor at his home the last two days preceding admission due to profound generalized weakness. No reported falls. Noted to have mild swelling of left UE per ED provider exam (likely dependent edema from laying on this side). CT of head obtained was negative for acute process. Normal CK.   -Therapy working with patient recommending TCU.  Patient has declined therapy at times.  Encourage nursing development chair with meals and ambulate.     Tobacco abuse   -No cravings, declined NRT. Advised to quit.     Obesity BMI 36  -Complicates cares      FEN: oral hydration; replacement protocol; low fiber  Activity: PT/OT-TCU  DVT Prophylaxis: Pneumatic Compression Devices  Code Status: Full Code   Family update: Discussed care plan with wife  Expected discharge: TBD awaiting placement, recommended to transitional care unit once safe disposition plan/ TCU bed available.  Difficult placement due to insurance coverage.    Lisa Truong, DO  [employer] (7am - 6pm, M-F)    Interval History   Feeling better today.  He is continued to have some abdominal discomfort but it is improving.  Decreased frequency of loose stools.  Utilizing Imodium.  Eating and drinking well.  No chest pain or palpitations.  No shortness of breath.  Does have appearance of increased work of breathing but reports that he is very comfortable.  Recommended additional dose of diuretic but patient declined.   Discussed care plan with family and nursing.     -Data reviewed today: I reviewed all new labs and imaging results over the last 24 hours. I personally reviewed     Physical Exam   Temp: 97.7  F (36.5  C) Temp src: Oral BP: (!) 146/88   Heart Rate: 76 Resp: 20 SpO2: 93 % O2 Device: None (Room air)    Vitals:    05/11/20 0733 05/14/20 0510 05/15/20 1127   Weight: 99.8 kg (220 lb) 109.5 kg (241 lb 6.4 oz) 116.1 kg (255 lb 14.4 oz)     Vital Signs with Ranges  Temp:  [97.3  F (36.3  C)-97.7  F (36.5  C)] 97.7  F (36.5  C)  Heart Rate:  [] 76  Resp:  [16-20] 20  BP: (137-153)/() 146/88  SpO2:  [92 %-93 %] 93 %  I/O last 3 completed shifts:  In: -   Out: 600 [Urine:600]    Constitutional: Awake, alert, cooperative, no apparent distress. Non-toxic. Disheveled.  Appears stated age.   HEENT: Atraumatic. Normocephalic. Conjunctiva non-injected. Sclera anicteric. MMM.   Respiratory: Moves air bilaterally. No wheezing, rales, or rhonchi. Appears to have increased work of breathing but reports he is comfortable. No oxygen.   Cardiovascular: Regular rate and rhythm, normal S1 and S2, and no murmur noted  GI: Obese, round, non-tender, no rebound tenderness or guarding. Normal bowel sounds  Skin/Integumen: No rashes, no cyanosis, Non-pitting edema in lower extremities.     Medications       ciprofloxacin  500 mg Oral Q12H HERMELINDA     folic acid  1 mg Oral Daily     multivitamin w/minerals  1 tablet Oral Daily     sodium chloride (PF)  3 mL Intracatheter Q8H     vitamin B1  100 mg Oral Daily     Data   Recent Labs   Lab 05/17/20  0655 05/16/20  1709 05/16/20  0616 05/15/20  0709  05/14/20  1745  05/13/20  0650  05/11/20  0800   WBC  --   --  6.5 7.5  --  7.4   < > 7.1   < > 9.6   HGB  --   --  11.2* 11.3*  --  11.5*   < > 10.5*   < > 12.7*   MCV  --   --  102* 103*  --  102*   < > 104*   < > 96   PLT  --   --  169 179  --  172   < > 119*   < > 154   INR  --   --   --   --   --   --   --   --   --  0.95     --  137 139   --  136   < > 136   < > 131*   POTASSIUM 3.6 3.8 3.1* 3.7   < > 3.3*   < > 3.4   < > 3.1*   CHLORIDE 102  --  102 104  --  101   < > 101   < > 92*   CO2 28  --  30 25  --  24   < > 30   < > 32   BUN 14  --  15 13  --  14   < > 18   < > 22   CR 0.59*  --  0.72 0.63*  --  0.66   < > 0.70   < > 0.79   ANIONGAP 6  --  5 10  --  11   < > 5   < > 7   SY 8.3*  --  8.3* 7.7*  --  8.1*   < > 8.3*   < > 8.6   GLC 92  --  101* 79  --  104*   < > 65*   < > 116*   ALBUMIN  --   --   --  2.2*  --   --   --  2.1*   < > 2.5*   PROTTOTAL  --   --   --  6.5*  --   --   --  6.0*   < > 7.2   BILITOTAL  --   --   --  1.7*  --   --   --  1.6*   < > 3.1*   ALKPHOS  --   --   --  111  --   --   --  115   < > 167*   ALT  --   --   --  77*  --   --   --  75*   < > 101*   AST  --   --   --  119*  --   --   --  127*   < > 156*   LIPASE  --   --   --   --   --   --   --   --   --  107   TROPI  --   --   --   --   --  0.020  --   --   --   --     < > = values in this interval not displayed.     No results found for this or any previous visit (from the past 24 hour(s)).

## 2020-05-17 NOTE — PLAN OF CARE
End of Shift Summary  For vital signs and complete assessments, please see documentation flowsheets.     Pertinent assessments:  LS exp wheezes. Reports pain, tylenol & oxy given. CIWA negative. Urine kit & incontinent of BM. Pt noted to have poor eye sight.   Major Shift Events: Uneventful.  Treatment Plan: Cipro po.     Discharge Readiness: Possible discharge  Expected Discharge Date: Today  Discharge Disposition: TCU  Criteria for discharge: Once safe disposition plan/TCU bed available.

## 2020-05-17 NOTE — PLAN OF CARE
End of Shift Summary  For vital signs and complete assessments, please see documentation flowsheets.     Pertinent assessments:  A&O x 4, forgetful.- improvement since yesterday. Up with A x 1, gait belt& walker. Up to chair for meals. Oxycodone and tylenol for abdominal pain. IV SL. Tolerating low fiber diet. Incontinent of BM, using urinal.  Major Shift Events: Uneventful.  Treatment Plan: Cipro po.     Discharge Readiness: Possible discharge  Expected Discharge Date: Monday (if bed available)   [facility] Disposition: [facility] vs [facility]  Criteria for discharge: Once safe disposition plan/TCU bed available.

## 2020-05-17 NOTE — PLAN OF CARE
Discharge Planner PT   Patient plan for discharge: TCU  Current status: Pt refusing OOB mobility on PT arrival, willing to perform supine exercises. PT stressed importance of OOB mobility to progress functional moblility and tolerance. Pt states he spent extended time in chair today. Instructed patient in strengthening exercises to help with ease of performing functional mobility. Cuing for pacing and technique, able to demonstrate modification.  Barriers to return to prior living situation: Fall risk, lives alone, level of A with transfers and self cares  Recommendations for discharge: TCU  Rationale for recommendations: Pt below baseline for mobility. Pt will need PT in a TCU setting to address strength, balance and activity tolerance deficits prior to returning home.        Entered by: Daniel Betancourt 05/17/2020 3:32 PM

## 2020-05-17 NOTE — PROGRESS NOTES
SW has left messages with TCU's to follow up on referral status.      NORAH Peña, [facility]  [phone]  [address]   [facility]. [id number]  [facility]

## 2020-05-17 NOTE — PROGRESS NOTES
"SPIRITUAL HEALTH SERVICES: Tele-Encounter  Patient Location ([facility], [facility], Unit): [facility] - Med/Surg 5  Spoke with (patient, family relationship): patient      Referral Source: Contacted pt per length of stay    If applicable: patient was appropriately screened for telechaplaincy support with bedside nurse prior to visit (e.g. Mental Health and Addiction contexts). See call details below.    DATA:  Called pt Angelo and introduced myself.  Angelo reported that he is doing much better now and that he hopes to discharge home soon.  He states that his family is his primary support system but that mostly \"I'm very independent.\" Pt states he has no needs at this time.     PLAN:  Oriented pt to Spiritual Health Services. [facility] remains available per request/referral.    Chaplain GEOVANI CARBAJAL M.Div, MA  Phone: [phone]  ______________________________    Type of service:  Telephone Visit     has received verbal consent for a TelephoneVisit from the patient? Yes    Distance Provider Location: designated [place] office or home office (secure setting)    Mode of Communication: telephone (via [employer] phone or [employer] tele-call-number ([phone]))      "

## 2020-05-18 ENCOUNTER — APPOINTMENT (OUTPATIENT)
Dept: PHYSICAL THERAPY | Facility: CLINIC | Age: 65
DRG: 391 | End: 2020-05-18
Payer: OTHER GOVERNMENT

## 2020-05-18 VITALS
HEART RATE: 84 BPM | TEMPERATURE: 98 F | HEIGHT: 68 IN | WEIGHT: 255.9 LBS | RESPIRATION RATE: 16 BRPM | DIASTOLIC BLOOD PRESSURE: 93 MMHG | BODY MASS INDEX: 38.78 KG/M2 | SYSTOLIC BLOOD PRESSURE: 157 MMHG | OXYGEN SATURATION: 94 %

## 2020-05-18 PROCEDURE — 25000132 ZZH RX MED GY IP 250 OP 250 PS 637: Performed by: INTERNAL MEDICINE

## 2020-05-18 PROCEDURE — 25000132 ZZH RX MED GY IP 250 OP 250 PS 637: Performed by: PHYSICIAN ASSISTANT

## 2020-05-18 PROCEDURE — 97530 THERAPEUTIC ACTIVITIES: CPT | Mod: GP | Performed by: PHYSICAL THERAPIST

## 2020-05-18 PROCEDURE — 99239 HOSP IP/OBS DSCHRG MGMT >30: CPT | Performed by: HOSPITALIST

## 2020-05-18 PROCEDURE — 25000132 ZZH RX MED GY IP 250 OP 250 PS 637: Performed by: HOSPITALIST

## 2020-05-18 RX ORDER — CIPROFLOXACIN 500 MG/1
500 TABLET, FILM COATED ORAL EVERY 12 HOURS
DISCHARGE
Start: 2020-05-18 | End: 2020-05-26

## 2020-05-18 RX ORDER — OXYCODONE HYDROCHLORIDE 5 MG/1
5-10 TABLET ORAL
Qty: 10 TABLET | Refills: 0 | Status: SHIPPED | DISCHARGE
Start: 2020-05-18 | End: 2020-05-23

## 2020-05-18 RX ORDER — FOLIC ACID 1 MG/1
1 TABLET ORAL DAILY
DISCHARGE
Start: 2020-05-19

## 2020-05-18 RX ORDER — LOPERAMIDE HCL 2 MG
2 CAPSULE ORAL 4 TIMES DAILY PRN
DISCHARGE
Start: 2020-05-18

## 2020-05-18 RX ORDER — CALCIUM CARBONATE 500 MG/1
2 TABLET, CHEWABLE ORAL EVERY 4 HOURS PRN
DISCHARGE
Start: 2020-05-18

## 2020-05-18 RX ORDER — ACETAMINOPHEN 325 MG/1
650 TABLET ORAL EVERY 4 HOURS PRN
DISCHARGE
Start: 2020-05-18

## 2020-05-18 RX ADMIN — MULTIPLE VITAMINS W/ MINERALS TAB 1 TABLET: TAB at 08:33

## 2020-05-18 RX ADMIN — CALCIUM CARBONATE (ANTACID) CHEW TAB 500 MG 1000 MG: 500 CHEW TAB at 08:36

## 2020-05-18 RX ADMIN — ACETAMINOPHEN 650 MG: 325 TABLET, FILM COATED ORAL at 08:33

## 2020-05-18 RX ADMIN — THIAMINE HCL TAB 100 MG 100 MG: 100 TAB at 08:33

## 2020-05-18 RX ADMIN — ACETAMINOPHEN 650 MG: 325 TABLET, FILM COATED ORAL at 15:58

## 2020-05-18 RX ADMIN — FOLIC ACID 1 MG: 1 TABLET ORAL at 08:33

## 2020-05-18 RX ADMIN — CIPROFLOXACIN HYDROCHLORIDE 500 MG: 500 TABLET, FILM COATED ORAL at 08:33

## 2020-05-18 RX ADMIN — OXYCODONE HYDROCHLORIDE 5 MG: 5 TABLET ORAL at 04:40

## 2020-05-18 RX ADMIN — LOPERAMIDE HYDROCHLORIDE 2 MG: 2 CAPSULE ORAL at 15:58

## 2020-05-18 RX ADMIN — OXYCODONE HYDROCHLORIDE 5 MG: 5 TABLET ORAL at 08:33

## 2020-05-18 ASSESSMENT — ACTIVITIES OF DAILY LIVING (ADL)
ADLS_ACUITY_SCORE: 19
ADLS_ACUITY_SCORE: 18
ADLS_ACUITY_SCORE: 18
ADLS_ACUITY_SCORE: 19
ADLS_ACUITY_SCORE: 18

## 2020-05-18 NOTE — PLAN OF CARE
End of Shift Summary  For vital signs and complete assessments, please see documentation flowsheets.     Pertinent assessments:  A&O x 4, forgetful. Up with A x 1, gait belt& walker.  Oxycodonex1 for abdominal pain. IV SL. Tolerating low fiber diet. Incontinent of BM, using urinal.  Major Shift Events: Uneventful.  Treatment Plan: Cipro po. Pain control     Discharge Readiness: Possible discharge  Expected Discharge Date: Monday (if bed available)   [facility] Disposition: [facility] vs [facility]  Criteria for discharge: Once safe disposition plan/TCU bed available.

## 2020-05-18 NOTE — PROGRESS NOTES
"CLINICAL NUTRITION SERVICES - REASSESSMENT NOTE      Malnutrition: (5/18/2020)  % Weight Loss: unable to determine --> limited weight history and 3 vastly different wts since admit making hard to determine  % Intake: Decreased intake does not meet criteria --> improved since 5/15/2020  Subcutaneous Fat Loss: unable to determine, deferred  Muscle Loss: unable to determine, deferred  Fluid Retention: Trace, LEs     Malnutrition Diagnosis: Unable to determine due to lack of weight history, nutrition hx and nutrition focused physical exam per direction of new department policy in the setting of COVID19       EVALUATION OF PROGRESS TOWARD GOALS   Diet:  Low fiber + Boost BID between meals    Intake/Tolerance:  Remains on diet as above.  At last RD assessment no meals ordered since admit.  Since this time, now ordering meals BID-TID with 100% intakes per flowsheet review.  Unable to determine if consuming supplements.  Overall PO intakes improving.    Potential barriers to PO intakes including: Previous withdrawal/mentation change, ongoing decreased appetite and degree of abdominal pain     Nutrition History: Unable to obtain, \"I'm getting discharged I need to go\".        ASSESSED NUTRITION NEEDS PER APPROVED PRACTICE GUIDELINES:     Dosing Weight 99.8 kg   Estimated Energy Needs: 0428-0902 kcals (20-25 Kcal/Kg)  Justification: maintenance and obese  Estimated Protein Needs: 100-120 grams protein (1-1.2 g pro/Kg)  Justification: maintenance  Estimated Fluid Needs: per MD       NEW FINDINGS:   - Medications reviewed including:     ciprofloxacin  500 mg Oral Q12H HERMELINDA     folic acid  1 mg Oral Daily     multivitamin w/minerals  1 tablet Oral Daily     sodium chloride (PF)  3 mL Intracatheter Q8H     vitamin B1  100 mg Oral Daily         - Labs reviewed including:  Electrolytes  Potassium (mmol/L)   Date Value   05/17/2020 3.6   05/16/2020 3.8   05/16/2020 3.1 (L)     Phosphorus (mg/dL)   Date Value   05/15/2020 3.5    Blood " Glucose  Glucose (mg/dL)   Date Value   05/17/2020 92   05/16/2020 101 (H)   05/15/2020 79   05/14/2020 104 (H)   05/14/2020 79    Inflammatory Markers  CRP Inflammation (mg/L)   Date Value   05/13/2020 40.9 (H)   05/11/2020 68.1 (H)     WBC (10e9/L)   Date Value   05/16/2020 6.5   05/15/2020 7.5   05/14/2020 7.4     Albumin (g/dL)   Date Value   05/15/2020 2.2 (L)   05/13/2020 2.1 (L)   05/12/2020 2.3 (L)      Magnesium (mg/dL)   Date Value   05/16/2020 1.9   05/16/2020 1.0 (L)   05/11/2020 1.6     Sodium (mmol/L)   Date Value   05/17/2020 136   05/16/2020 137   05/15/2020 139    Renal  Urea Nitrogen (mg/dL)   Date Value   05/17/2020 14   05/16/2020 15   05/15/2020 13     Creatinine (mg/dL)   Date Value   05/17/2020 0.59 (L)   05/16/2020 0.72   05/15/2020 0.63 (L)     Additional  Ketones Urine (mg/dL)   Date Value   05/11/2020 Trace (A)        -  Weight trending reviewed.  Unable to evaluate true wt trending as trending up during admit and has received Lasix:  Vitals:    05/11/20 0733 05/14/20 0510 05/15/20 1127   Weight: 99.8 kg (220 lb) 109.5 kg (241 lb 6.4 oz) 116.1 kg (255 lb 14.4 oz)     - Stooling patterns reviewed.      Previous Goals:   Patient to consume at least 50% of meals or supplements TID to show improvement in PO intakes.  Evaluation: Met    Previous Nutrition Diagnosis:   Inadequate oral intake related to abdominal pain 2/2 diverticulitis with abscess, now withdrawing as evidenced by no meals ordered and meeting <50% needs since admit, suspect <75% needs over period of at least 1 week PTA.  Evaluation: Completed      CURRENT NUTRITION DIAGNOSIS  Predicted suboptimal nutrient intake (energy/protein) related to no meals ordered/consumed from admit until 5/15/2020, now consuming 100% of meals BID-TID, potential for decline with ongoing abdominal pain and suspect variable appetite.    INTERVENTIONS  Recommendations / Nutrition Prescription  Diet per CRS.     Continue supplements though decrease  frequency to 1x/day as improving PO intakes.    Continue daily MVI/M (etoh hx).      Implementation  Collaboration and Referral of Nutrition care: Discussed POC with team during rounds.      Goals  Patient to consume at least 75% of meals or supplements TID.      MONITORING AND EVALUATION:  Progress towards goals will be monitored and evaluated per protocol and Practice Guidelines      Marce Lewis RDN, LD  Clinical Dietitian  3rd floor/ICU: [phone]  All other floors: [phone]  Weekend/holiday: [phone]

## 2020-05-18 NOTE — PROGRESS NOTES
Discharge Planner   Discharge Plans in progress: Spoke with pt and son Tawanda today.. Tawanda updated SW that family had moved pt here almost a year ago to get him aware from his wife.. SW unsure what the situation is at this time with pt's spouse.. Pt is aware that SW had limited option for placement due to his ins.. Referrals sent.. [facility] a [facility] contract and was able to obtain auth from his [facility] policy.. SW update son on location and contact for him once pt leave today  Spoke with Tawanda about w/c transport and cost.. He is aware. He is concerned about his dad once he leave TCU and returns home.. SW encouraged family to help pt call [facility] and update that he now is living in [place] and transfer his health plan to the correct region.   Barriers to discharge plan: none. Pt aware he will not be allowed to drink or smoke while in TCU..     Follow up plan: Order faxed and transport set up for 1645  May 18th, 2020 at 02:19:00 PM CDT. The confirmation number is [id number]         Entered by: Corinne C. White 05/18/2020 2:19 PM

## 2020-05-18 NOTE — DISCHARGE SUMMARY
"[facility]    Discharge Summary  Hospitalist    Date of Admission:  5/11/2020  Date of Discharge:  5/18/2020  Discharging Provider: Tyrese Lamar MD  Date of Service (when I saw the patient): 05/18/20    Discharge Diagnoses   Acute sigmoid diverticulitis with abscess  Diarrhea, unspecified  History of BARBARA  Alcohol use disorder with withdrawal seizure  Alcoholic hepatitis  Macrocytic anemia secondary to alcohol use  Tobacco use disorder    History of Present Illness   \"Dago Sheikh is a 64 year old male who presents with diarrhea and lower abdominal pain.  The patient states that he has been having diarrhea intermittently over the last month.  He reports having up to 3-4 nonbloody stools per day with improvement in his output after taking over-the-counter Imodium.  With his diarrhea he also notes left and central lower abdominal pain that intermittently radiates into his upper abdomen with associated nausea and 2-3 episodes of nonbloody emesis this past week.  He reports taking an over-the-counter antacid at home with some relief in his upper abdominal discomfort.  He notes decreased appetite resulting in poor oral intake.  He states over the weekend he was able to tolerate drinking some water and 7-Up.  The patient states that on Friday he ended up laying down on the floor on his left side to watch TV since he does not own a couch and has had a difficult time getting off the floor since. Denies any fall prior to laying down.  The patient states that his son's girlfriend visited him on Saturday and was unable to help him up but was not concerned enough to call for help. Denies any recent fever, chills, cough, chest pain, shortness of breath, or urinary symptoms.  The patient states that he has not left his home recently and that one of his two sons have been bringing him food in order for him to avoid leaving his house.  Patient reports drinks approximately 2 pints of vodka and 2-3 bottles of wine per " "week.  His last drink was on Friday evening.  Denies feeling tremulous or anxious.  Denies prior history of alcohol withdrawal or seizure when stopping drinking.  Denies prior history of asthma or COPD. He does not use supplemental oxygen at home. Denies any known COVID-19 exposure. He reports being hospitalized in [place] last year for a c diff infection. Denies any recent antibiotic use. He reports his last colonoscopy about 5 years ago showed diverticulosis. [facility].\"    [facility] Course   Dago Sheikh is a 64 year old male with PMH significant for alcohol abuse, chronic knee and back pain, h/o c diff, diverticulosis, and tobacco abuse who presents with diarrhea and abdominal pain.      Acute sigmoid diverticulitis with abscess. E. Coli, Morganella morganii, and proteus mirabilis  Left and centralized lower abdominal pain for the past month with intermittent nonbloody diarrhea, nausea, and a few episodes of vomiting this past week with increased weakness, decreased appetite and poor PO intake. Has prior history of diverticulosis on colonoscopy performed about 5 years ago but no h/o diverticulitis. Afebrile, no leukocytosis. CT scan showed acute sigmoid diverticulitis with a small adjacent diverticular abscess measuring 2.5 cm (likely not amenable to CT guided drainage due to its small size). CRP 68, PCT 0.84, ESR 48. Aspiration performed by IR.   -Colorectal surgery consulted - appreciate assistance  -Low fiber diet per colorectal - encourage oral fluids  -Supportive care with PRN antiemetics, and pain control (requiring oxycodone)  -Zosyn (5/11 - 5/16); Ciprofloxacin (5/16 - 5/25); culture susceptibilities reviewed  -Colonoscopy in approximately 6 weeks     Diarrhea  Hx of C. Diff. Intermittent diarrhea prior to starting abx. Diarrhea for roughly 1 month taking imodium. C. Diff negative. Stool PCR negative. Decreased frequency of stools.   -Imodium prn      Acute " hypoxic respiratory failure  Episode of hypoxia associated with volume overload.  Patient given Lasix 20 mg IV once.  Had adequate diuresis was titrated off of oxygen.  IV fluids were discontinued and oral hydration was encouraged     History of BARBARA.   Hypoxia on admission. Non-compliant with CPAP for 2 years. COVID-19 negative.   -RT consulted to evaluate CPAP settings. Monitor respiratory status.      Electrolyte derangement (hyponatremia, hypokalemia, hypomagnesemia, and hypochloremia):   Due to poor oral intake and diarrhea. Improved.   -Replace per protocol.        Daily alcohol abuse with acute alcohol withdrawal seizure   Drinks about 2 pints of vodka and 2-3 bottles of wine per week with his last drink the evening of 5/8. Reports no h/o withdrawal or seizures with stopping drinking. Etoh level negative. Seizure with acute mentation change on 5/14.   -CIWA protocol was initiated.  Patient is now outside of the window for withdrawal.  Improved.  Cessation advised.      Alcoholic Hepatitis:   Bilirubin and liver enzymes improving.  -LFTs improving      Macrocytic anemia of alcoholism:   Hgb 11.5 (12.7), continue to monitor. No signs of bleeding.      Weakness  unable to get up off the floor at his home the last two days preceding admission due to profound generalized weakness. No reported falls. Noted to have mild swelling of left UE per ED provider exam (likely dependent edema from laying on this side). CT of head obtained was negative for acute process. Normal CK.   -Therapy working with patient recommending TCU.      Tobacco abuse   -No cravings, declined NRT. Advised to quit.      Obesity BMI 36  -Complicates cares     Tyrese Lamar MD      Pending Results   These results will be followed up by PCP, colorectal surgery  Unresulted Labs Ordered in the Past 30 Days of this Admission     Date and Time Order Name Status Description    5/12/2020 1326 Anaerobic bacterial culture Preliminary           Code Status    Full Code       Primary Care Physician   [facility]    Physical Exam   Temp: 98.1  F (36.7  C) Temp src: Oral BP: (!) 145/84 Pulse: 94 Heart Rate: 63 Resp: 20 SpO2: 94 % O2 Device: None (Room air)    Vitals:    05/11/20 0733 05/14/20 0510 05/15/20 1127   Weight: 99.8 kg (220 lb) 109.5 kg (241 lb 6.4 oz) 116.1 kg (255 lb 14.4 oz)     Vital Signs with Ranges  Temp:  [96.8  F (36  C)-98.5  F (36.9  C)] 98.1  F (36.7  C)  Pulse:  [94] 94  Heart Rate:  [63-79] 63  Resp:  [18-20] 20  BP: (134-151)/(78-84) 145/84  SpO2:  [91 %-94 %] 94 %  I/O last 3 completed shifts:  In: -   Out: 420 [Urine:420]    Constitutional: Awake, alert, cooperative, no apparent distress. Non-toxic.   HEENT: Atraumatic. Normocephalic. Conjunctiva non-injected. Sclera anicteric. MMM.   Respiratory: Moves air bilaterally. No wheezing, rales, or rhonchi.   Cardiovascular: Regular rate and rhythm, normal S1 and S2, and no murmur noted  GI: Obese, round, non-tender, no rebound tenderness or guarding. Normal bowel sounds  Ext: WWP  Skin/Integumen: No rashes, no cyanosis, Non-pitting edema in lower extremities.     Discharge Disposition   Discharged to short-term care facility  Condition at discharge: Stable    Consultations This Hospital Stay   PHYSICAL THERAPY ADULT IP CONSULT  COLORECTAL SURGERY IP CONSULT  SOCIAL WORK IP CONSULT  PHYSICAL THERAPY ADULT IP CONSULT  OCCUPATIONAL THERAPY ADULT IP CONSULT    Time Spent on this Encounter   I, Tyrese Lamar MD, personally saw the patient today and spent greater than 30 minutes discharging this patient.    Discharge Orders      Follow Up ([facility])    We recommend a colonoscopy 6 weeks after your acute diverticulitis resolves. Please call our office to arrange [phone] or arrange through your PCP.     General info for SNF    Length of Stay Estimate: Short Term Care: Estimated # of Days <30  Condition at Discharge: Improving  Level of care:skilled   Rehabilitation Potential: Fair  Admission H&P  remains valid and up-to-date: Yes  Recent Chemotherapy: N/A  Use Nursing Home Standing Orders: Yes     Mantoux instructions    Give two-step Mantoux (PPD) Per Facility Policy Yes     Reason for your hospital stay    Your hospitalized for diverticulitis and abscess.  You underwent drainage allergy.  You received IV antibiotics.  You will complete a course of oral antibiotics on discharge.  You also did have alcohol withdrawal during her hospital stay.  You should abstain from alcohol moving forward.     Follow Up and recommended labs and tests    Follow up with primary care provider in 1 weeks.  The following labs/tests are recommended: CMP, CBC.    He will need to follow-up with colorectal surgery and have an outpatient colonoscopy in approximately 6 weeks.     Activity - Up ad florencia     Full Code     Physical Therapy Adult Consult    Evaluate and treat as clinically indicated.    Reason:  Generalized weakness     Occupational Therapy Adult Consult    Evaluate and treat as clinically indicated.    Reason:  Generalized weakness     Advance Diet as Tolerated    Follow this diet upon discharge: Orders Placed This Encounter      Snacks/Supplements Adult: Boost Plus; Between Meals      Snacks/Supplements Adult: Boost Plus; Between Meals      Low Fiber Diet     Discharge Medications   Current Discharge Medication List      START taking these medications    Details   acetaminophen (TYLENOL) 325 MG tablet Take 2 tablets (650 mg) by mouth every 4 hours as needed for mild pain  Qty:      Associated Diagnoses: Sigmoid diverticulitis      calcium carbonate (TUMS) 500 MG chewable tablet Take 2 tablets (1,000 mg) by mouth every 4 hours as needed for heartburn    Associated Diagnoses: Abdominal pain, generalized      ciprofloxacin (CIPRO) 500 MG tablet Take 1 tablet (500 mg) by mouth every 12 hours for 8 days    Associated Diagnoses: Sigmoid diverticulitis      folic acid (FOLVITE) 1 MG tablet Take 1 tablet (1 mg) by mouth  daily  Qty:      Associated Diagnoses: Alcohol withdrawal syndrome with complication (H)      loperamide (IMODIUM) 2 MG capsule Take 1 capsule (2 mg) by mouth 4 times daily as needed for diarrhea  Qty:      Associated Diagnoses: Diarrhea, unspecified type      melatonin 1 MG TABS tablet Take 1 tablet (1 mg) by mouth nightly as needed for sleep  Qty:      Associated Diagnoses: Insomnia, unspecified type      oxyCODONE (ROXICODONE) 5 MG tablet Take 1-2 tablets (5-10 mg) by mouth every 3 hours as needed for breakthrough pain  Qty: 10 tablet, Refills: 0    Associated Diagnoses: Sigmoid diverticulitis         CONTINUE these medications which have NOT CHANGED    Details   omeprazole (PRILOSEC OTC) 20 MG EC tablet Take 20 mg by mouth daily as needed         STOP taking these medications       ibuprofen (ADVIL/MOTRIN) 400 MG tablet Comments:   Reason for Stopping:         methocarbamol (ROBAXIN) 500 MG tablet Comments:   Reason for Stopping:             Allergies   No Known Allergies  Data   Most Recent 3 CBC's:  Recent Labs   Lab Test 05/16/20  0616 05/15/20  0709 05/14/20  1745   WBC 6.5 7.5 7.4   HGB 11.2* 11.3* 11.5*   * 103* 102*    179 172      Most Recent 3 BMP's:  Recent Labs   Lab Test 05/17/20  0655 05/16/20  1709 05/16/20  0616 05/15/20  0709     --  137 139   POTASSIUM 3.6 3.8 3.1* 3.7   CHLORIDE 102  --  102 104   CO2 28  --  30 25   BUN 14  --  15 13   CR 0.59*  --  0.72 0.63*   ANIONGAP 6  --  5 10   SY 8.3*  --  8.3* 7.7*   GLC 92  --  101* 79     Most Recent 2 LFT's:  Recent Labs   Lab Test 05/15/20  0709 05/13/20  0650   * 127*   ALT 77* 75*   ALKPHOS 111 115   BILITOTAL 1.7* 1.6*     Most Recent INR's and Anticoagulation Dosing History:  Anticoagulation Dose History     Recent Dosing and Labs Latest Ref Rng & Units 5/11/2020    INR 0.86 - 1.14 0.95        Most Recent 3 Troponin's:  Recent Labs   Lab Test 05/14/20  1745   TROPI 0.020     Most Recent Cholesterol Panel:No lab  results found.  Most Recent 6 Bacteria Isolates From Any Culture (See EPIC Reports for Culture Details):  Recent Labs   Lab Test 05/12/20  1320   CULT Light growth  Escherichia coli  *  Moderate growth  Morganella (Proteus) morganii  *  Light growth  Proteus mirabilis  *  Culture negative monitoring continues     Most Recent TSH, T4 and A1c Labs:No lab results found.

## 2020-05-18 NOTE — PLAN OF CARE
For vital signs and complete assessments, please see documentation flowsheets.     Pertinent assessments:VSS, Denies SOB. Oxycodone given for back pain. CIWA 0, using urinal.   Major Shift Events: None  Treatment Plan: Cipro po and pain control.  Discharge Readiness: Medically active  Expected Discharge Date: [facility] 5/18/20  [facility]  Barriers/Criteria for discharge: Once safe disposition plan/TCU available

## 2020-05-18 NOTE — PLAN OF CARE
Discharge Planner PT   Patient plan for discharge: TCU  Current status: Pt willing to perform OOB mobility, instructed patient to perform supine to sit with SBA. Pt able to demonstrated ability to perform multiple sit to stand transfers during session with CGA and FWW. Pt with uncontrolled descent, cuing to perform slowly with control. Initially patient wanting to sit in chair but then wanting to return to bed to rest. SPT each way with FWW and CGA.  Barriers to return to prior living situation: Fall risk, lives alone, level of A with transfers and self cares  Recommendations for discharge: TCU  Rationale for recommendations: Pt below baseline for mobility. Pt will need PT in a TCU setting to address strength, balance and activity tolerance deficits prior to returning home.        Entered by: Daniel Betancourt 05/18/2020 12:28 PM

## 2020-05-18 NOTE — PROGRESS NOTES
"Colon & Rectal Surgery Progress Note             Interval History:   Denies abdominal pain. + loose BM's. No fevers.                Medications:   I have reviewed this patient's current medications               Physical Exam:   Blood pressure (!) 145/84, pulse 94, temperature 98.1  F (36.7  C), temperature source Oral, resp. rate 20, height 1.727 m (5' 8\"), weight 116.1 kg (255 lb 14.4 oz), SpO2 94 %.    Intake/Output Summary (Last 24 hours) at 5/18/2020 1132  Last data filed at 5/18/2020 0856  Gross per 24 hour   Intake --   Output 520 ml   Net -520 ml     GEN:  alert  ABD:  Soft, non-tender         Data:        Lab Results   Component Value Date     05/17/2020    Lab Results   Component Value Date    CHLORIDE 102 05/17/2020    Lab Results   Component Value Date    BUN 14 05/17/2020      Lab Results   Component Value Date    POTASSIUM 3.6 05/17/2020    Lab Results   Component Value Date    CO2 28 05/17/2020    Lab Results   Component Value Date    CR 0.59 05/17/2020    CR 0.72 05/16/2020        Lab Results   Component Value Date    HGB 11.2 (L) 05/16/2020    HGB 11.3 (L) 05/15/2020     Lab Results   Component Value Date     05/16/2020     05/15/2020     Lab Results   Component Value Date    WBC 6.5 05/16/2020    WBC 7.5 05/15/2020            Assessment and Plan:   Finish 14 days of ABX,   Will need a colonoscopy in 6 weeks or so.      Zulma Pederson MD  [facility]  Office Phone # [phone]    "

## 2020-05-18 NOTE — PLAN OF CARE
To Do:  End of Shift Summary  For vital signs and complete assessments, please see documentation flowsheets.     Pertinent assessments:VSS, Denies SOB. Oxycodone given for back pain. CIWA 0, using urinal and urine dark orange, fecal incontinence and patient isn't aware when he goes   Major Shift Events: fecal incontinence, pain controlled with oral medications, tolerating diet low fiber, worked with PT but declined to sit up in chair for meal, plan is to discharge to TCU today  Treatment Plan: Cipro po and pain control.  Discharge Readiness: discharge today  Expected Discharge Date: [facility] 5/18/20  Discharge Disposition:TCU   Barriers/Criteria for discharge: none, [facility] transport planned for 4:45 pm

## 2020-05-18 NOTE — PROGRESS NOTES
Patient discharged at 1645 via [facility] transport to TCU. IV was taken out, all patient belongings were with patient at time of discharge (wallet, cell phone , clothing, cell phone). A&O x4, forgetful at times. Son Tawanda brought clothing for the patient to take to TCU. Scripts were printed for patient to bring to TCU. Tylenol given for back pain, imodium given for diarrhea. No other concerns at this time.

## 2020-05-19 LAB
BACTERIA SPEC CULT: NORMAL
Lab: NORMAL
SPECIMEN SOURCE: NORMAL

## 2020-05-19 NOTE — PLAN OF CARE
Physical Therapy Discharge Summary    Reason for therapy discharge:    Discharged to transitional care facility.    Progress towards therapy goal(s). See goals on Care Plan in [facility] electronic health record for goal details.  Goals not met.  Barriers to achieving goals:   discharge from facility.    Therapy recommendation(s):    Continued therapy is recommended.  Rationale/Recommendations:  PT as indicated at TCU.       Note: Pt not seen by documenting PT on this date. Information obtained from chart review.

## 2024-01-17 NOTE — PROGRESS NOTES
Responded to RRT call, ABG done on 4 lpm Oxymask, right radial, pressure held 5 min. sample brought to lab.   Yes